# Patient Record
Sex: FEMALE | Race: BLACK OR AFRICAN AMERICAN | NOT HISPANIC OR LATINO | URBAN - METROPOLITAN AREA
[De-identification: names, ages, dates, MRNs, and addresses within clinical notes are randomized per-mention and may not be internally consistent; named-entity substitution may affect disease eponyms.]

---

## 2023-07-28 ENCOUNTER — HOSPITAL ENCOUNTER (OUTPATIENT)
Facility: OTHER | Age: 88
Discharge: HOME | End: 2023-07-31
Attending: EMERGENCY MEDICINE | Admitting: HOSPITALIST
Payer: MEDICARE

## 2023-07-28 DIAGNOSIS — I50.32 CHRONIC DIASTOLIC HEART FAILURE: ICD-10-CM
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
## 2023-07-28 DIAGNOSIS — R79.89 ELEVATED TROPONIN: Primary | ICD-10-CM

## 2023-07-28 DIAGNOSIS — R42 LIGHTHEADEDNESS: ICD-10-CM

## 2023-07-28 DIAGNOSIS — M54.50 ACUTE MIDLINE LOW BACK PAIN WITHOUT SCIATICA: ICD-10-CM

## 2023-07-28 DIAGNOSIS — R42 DIZZINESS: ICD-10-CM

## 2023-07-28 DIAGNOSIS — R54 AGE-RELATED PHYSICAL DEBILITY: ICD-10-CM

## 2023-07-28 DIAGNOSIS — J10.1 INFLUENZA B: ICD-10-CM

## 2023-07-28 LAB
ALBUMIN SERPL BCP-MCNC: 4 G/DL (ref 3.5–5.2)
ALP SERPL-CCNC: 84 U/L (ref 55–135)
ALT SERPL W/O P-5'-P-CCNC: 14 U/L (ref 10–44)
ANION GAP SERPL CALC-SCNC: 14 MMOL/L (ref 8–16)
AST SERPL-CCNC: 22 U/L (ref 10–40)
BASOPHILS # BLD AUTO: 0.02 K/UL (ref 0–0.2)
BASOPHILS NFR BLD: 0.5 % (ref 0–1.9)
BILIRUB SERPL-MCNC: 0.4 MG/DL (ref 0.1–1)
BUN SERPL-MCNC: 21 MG/DL (ref 10–30)
CALCIUM SERPL-MCNC: 9.8 MG/DL (ref 8.7–10.5)
CHLORIDE SERPL-SCNC: 105 MMOL/L (ref 95–110)
CO2 SERPL-SCNC: 21 MMOL/L (ref 23–29)
CREAT SERPL-MCNC: 1.3 MG/DL (ref 0.5–1.4)
CTP QC/QA: YES
DIFFERENTIAL METHOD: ABNORMAL
EOSINOPHIL # BLD AUTO: 0.1 K/UL (ref 0–0.5)
EOSINOPHIL NFR BLD: 1.3 % (ref 0–8)
ERYTHROCYTE [DISTWIDTH] IN BLOOD BY AUTOMATED COUNT: 15.5 % (ref 11.5–14.5)
EST. GFR  (NO RACE VARIABLE): 37 ML/MIN/1.73 M^2
GLUCOSE SERPL-MCNC: 87 MG/DL (ref 70–110)
HCT VFR BLD AUTO: 37.7 % (ref 37–48.5)
HGB BLD-MCNC: 11.9 G/DL (ref 12–16)
IMM GRANULOCYTES # BLD AUTO: 0.03 K/UL (ref 0–0.04)
IMM GRANULOCYTES NFR BLD AUTO: 0.8 % (ref 0–0.5)
LYMPHOCYTES # BLD AUTO: 1 K/UL (ref 1–4.8)
LYMPHOCYTES NFR BLD: 25.7 % (ref 18–48)
MCH RBC QN AUTO: 27.6 PG (ref 27–31)
MCHC RBC AUTO-ENTMCNC: 31.6 G/DL (ref 32–36)
MCV RBC AUTO: 88 FL (ref 82–98)
MONOCYTES # BLD AUTO: 0.6 K/UL (ref 0.3–1)
MONOCYTES NFR BLD: 15 % (ref 4–15)
NEUTROPHILS # BLD AUTO: 2.1 K/UL (ref 1.8–7.7)
NEUTROPHILS NFR BLD: 56.7 % (ref 38–73)
NRBC BLD-RTO: 0 /100 WBC
PLATELET # BLD AUTO: 199 K/UL (ref 150–450)
PMV BLD AUTO: 9.9 FL (ref 9.2–12.9)
POC MOLECULAR INFLUENZA A AGN: NEGATIVE
POC MOLECULAR INFLUENZA B AGN: POSITIVE
POTASSIUM SERPL-SCNC: 4.6 MMOL/L (ref 3.5–5.1)
PROT SERPL-MCNC: 7.8 G/DL (ref 6–8.4)
RBC # BLD AUTO: 4.31 M/UL (ref 4–5.4)
SODIUM SERPL-SCNC: 140 MMOL/L (ref 136–145)
TROPONIN I SERPL DL<=0.01 NG/ML-MCNC: 0.04 NG/ML (ref 0–0.03)
WBC # BLD AUTO: 3.74 K/UL (ref 3.9–12.7)

## 2023-07-28 PROCEDURE — 96361 HYDRATE IV INFUSION ADD-ON: CPT

## 2023-07-28 PROCEDURE — 80053 COMPREHEN METABOLIC PANEL: CPT | Performed by: EMERGENCY MEDICINE

## 2023-07-28 PROCEDURE — 96374 THER/PROPH/DIAG INJ IV PUSH: CPT

## 2023-07-28 PROCEDURE — 85025 COMPLETE CBC W/AUTO DIFF WBC: CPT | Performed by: EMERGENCY MEDICINE

## 2023-07-28 PROCEDURE — 99285 EMERGENCY DEPT VISIT HI MDM: CPT | Mod: 25

## 2023-07-28 PROCEDURE — 93010 ELECTROCARDIOGRAM REPORT: CPT | Mod: ,,, | Performed by: INTERNAL MEDICINE

## 2023-07-28 PROCEDURE — 84484 ASSAY OF TROPONIN QUANT: CPT | Performed by: EMERGENCY MEDICINE

## 2023-07-28 PROCEDURE — 93010 EKG 12-LEAD: ICD-10-PCS | Mod: ,,, | Performed by: INTERNAL MEDICINE

## 2023-07-28 PROCEDURE — 93005 ELECTROCARDIOGRAM TRACING: CPT

## 2023-07-28 PROCEDURE — 87635 SARS-COV-2 COVID-19 AMP PRB: CPT | Performed by: EMERGENCY MEDICINE

## 2023-07-28 RX ORDER — ATORVASTATIN CALCIUM 10 MG/1
10 TABLET, FILM COATED ORAL DAILY
COMMUNITY

## 2023-07-28 RX ORDER — NAPROXEN 500 MG/1
500 TABLET ORAL
Status: COMPLETED | OUTPATIENT
Start: 2023-07-28 | End: 2023-07-29

## 2023-07-28 RX ORDER — FERROUS SULFATE 325(65) MG
325 TABLET ORAL 2 TIMES DAILY
COMMUNITY

## 2023-07-28 RX ORDER — CHOLECALCIFEROL (VITAMIN D3) 25 MCG
2000 TABLET ORAL DAILY
COMMUNITY

## 2023-07-28 RX ORDER — SODIUM CHLORIDE 9 MG/ML
1000 INJECTION, SOLUTION INTRAVENOUS
Status: COMPLETED | OUTPATIENT
Start: 2023-07-28 | End: 2023-07-29

## 2023-07-28 RX ORDER — LISINOPRIL AND HYDROCHLOROTHIAZIDE 12.5; 2 MG/1; MG/1
1 TABLET ORAL DAILY
Status: ON HOLD | COMMUNITY
End: 2023-07-29

## 2023-07-28 RX ORDER — LISINOPRIL 20 MG/1
30 TABLET ORAL DAILY
COMMUNITY

## 2023-07-28 RX ORDER — FUROSEMIDE 20 MG/1
20 TABLET ORAL
COMMUNITY

## 2023-07-28 NOTE — Clinical Note
Diagnosis: Elevated troponin [081552]   Future Attending Provider: AUSTIN CORTES [[id number]]   Admitting Provider:: AUSTIN CORTES [[id number]]

## 2023-07-29 PROBLEM — R79.89 ELEVATED TROPONIN: Status: ACTIVE | Noted: 2023-07-29

## 2023-07-29 PROBLEM — E03.9 ACQUIRED HYPOTHYROIDISM: Status: ACTIVE | Noted: 2023-07-29

## 2023-07-29 PROBLEM — I16.1 HYPERTENSIVE EMERGENCY: Status: ACTIVE | Noted: 2023-07-29

## 2023-07-29 PROBLEM — I16.0 HYPERTENSIVE URGENCY: Status: ACTIVE | Noted: 2023-07-29

## 2023-07-29 PROBLEM — J10.1 INFLUENZA B: Status: ACTIVE | Noted: 2023-07-29

## 2023-07-29 LAB
ALBUMIN SERPL BCP-MCNC: 3.6 G/DL (ref 3.5–5.2)
ALP SERPL-CCNC: 82 U/L (ref 55–135)
ALT SERPL W/O P-5'-P-CCNC: 12 U/L (ref 10–44)
ANION GAP SERPL CALC-SCNC: 11 MMOL/L (ref 8–16)
AST SERPL-CCNC: 20 U/L (ref 10–40)
BASOPHILS # BLD AUTO: 0.03 K/UL (ref 0–0.2)
BASOPHILS NFR BLD: 0.6 % (ref 0–1.9)
BILIRUB SERPL-MCNC: 0.6 MG/DL (ref 0.1–1)
BILIRUB UR QL STRIP: NEGATIVE
BUN SERPL-MCNC: 21 MG/DL (ref 10–30)
CALCIUM SERPL-MCNC: 9.5 MG/DL (ref 8.7–10.5)
CHLORIDE SERPL-SCNC: 104 MMOL/L (ref 95–110)
CLARITY UR: CLEAR
CO2 SERPL-SCNC: 25 MMOL/L (ref 23–29)
COLOR UR: YELLOW
CREAT SERPL-MCNC: 1.3 MG/DL (ref 0.5–1.4)
CTP QC/QA: YES
DIFFERENTIAL METHOD: ABNORMAL
EOSINOPHIL # BLD AUTO: 0 K/UL (ref 0–0.5)
EOSINOPHIL NFR BLD: 0.8 % (ref 0–8)
ERYTHROCYTE [DISTWIDTH] IN BLOOD BY AUTOMATED COUNT: 15.6 % (ref 11.5–14.5)
EST. GFR  (NO RACE VARIABLE): 37 ML/MIN/1.73 M^2
GLUCOSE SERPL-MCNC: 123 MG/DL (ref 70–110)
GLUCOSE UR QL STRIP: NEGATIVE
HCT VFR BLD AUTO: 37.2 % (ref 37–48.5)
HGB BLD-MCNC: 11.5 G/DL (ref 12–16)
HGB UR QL STRIP: ABNORMAL
IMM GRANULOCYTES # BLD AUTO: 0.03 K/UL (ref 0–0.04)
IMM GRANULOCYTES NFR BLD AUTO: 0.6 % (ref 0–0.5)
KETONES UR QL STRIP: NEGATIVE
LEUKOCYTE ESTERASE UR QL STRIP: NEGATIVE
LYMPHOCYTES # BLD AUTO: 0.6 K/UL (ref 1–4.8)
LYMPHOCYTES NFR BLD: 13.1 % (ref 18–48)
MAGNESIUM SERPL-MCNC: 1.8 MG/DL (ref 1.6–2.6)
MCH RBC QN AUTO: 27.3 PG (ref 27–31)
MCHC RBC AUTO-ENTMCNC: 30.9 G/DL (ref 32–36)
MCV RBC AUTO: 88 FL (ref 82–98)
MONOCYTES # BLD AUTO: 0.5 K/UL (ref 0.3–1)
MONOCYTES NFR BLD: 10.2 % (ref 4–15)
NEUTROPHILS # BLD AUTO: 3.7 K/UL (ref 1.8–7.7)
NEUTROPHILS NFR BLD: 74.7 % (ref 38–73)
NITRITE UR QL STRIP: NEGATIVE
NRBC BLD-RTO: 0 /100 WBC
PH UR STRIP: 6 [PH] (ref 5–8)
PHOSPHATE SERPL-MCNC: 3.7 MG/DL (ref 2.7–4.5)
PLATELET # BLD AUTO: 191 K/UL (ref 150–450)
PMV BLD AUTO: 9.9 FL (ref 9.2–12.9)
POTASSIUM SERPL-SCNC: 3.4 MMOL/L (ref 3.5–5.1)
PROT SERPL-MCNC: 7 G/DL (ref 6–8.4)
PROT UR QL STRIP: ABNORMAL
RBC # BLD AUTO: 4.22 M/UL (ref 4–5.4)
SARS-COV-2 RDRP RESP QL NAA+PROBE: NEGATIVE
SODIUM SERPL-SCNC: 140 MMOL/L (ref 136–145)
SP GR UR STRIP: 1.02 (ref 1–1.03)
TROPONIN I SERPL DL<=0.01 NG/ML-MCNC: 0.04 NG/ML (ref 0–0.03)
TROPONIN I SERPL DL<=0.01 NG/ML-MCNC: 0.07 NG/ML (ref 0–0.03)
URN SPEC COLLECT METH UR: ABNORMAL
UROBILINOGEN UR STRIP-ACNC: NEGATIVE EU/DL
WBC # BLD AUTO: 4.89 K/UL (ref 3.9–12.7)

## 2023-07-29 PROCEDURE — 94761 N-INVAS EAR/PLS OXIMETRY MLT: CPT

## 2023-07-29 PROCEDURE — 25000003 PHARM REV CODE 250: Performed by: EMERGENCY MEDICINE

## 2023-07-29 PROCEDURE — 84484 ASSAY OF TROPONIN QUANT: CPT | Mod: 91 | Performed by: NURSE PRACTITIONER

## 2023-07-29 PROCEDURE — 96372 THER/PROPH/DIAG INJ SC/IM: CPT | Performed by: NURSE PRACTITIONER

## 2023-07-29 PROCEDURE — 80053 COMPREHEN METABOLIC PANEL: CPT | Performed by: NURSE PRACTITIONER

## 2023-07-29 PROCEDURE — 83735 ASSAY OF MAGNESIUM: CPT | Performed by: NURSE PRACTITIONER

## 2023-07-29 PROCEDURE — 63600175 PHARM REV CODE 636 W HCPCS: Performed by: EMERGENCY MEDICINE

## 2023-07-29 PROCEDURE — 97530 THERAPEUTIC ACTIVITIES: CPT

## 2023-07-29 PROCEDURE — 36415 COLL VENOUS BLD VENIPUNCTURE: CPT | Performed by: NURSE PRACTITIONER

## 2023-07-29 PROCEDURE — G0378 HOSPITAL OBSERVATION PER HR: HCPCS

## 2023-07-29 PROCEDURE — 96376 TX/PRO/DX INJ SAME DRUG ADON: CPT

## 2023-07-29 PROCEDURE — 99233 SBSQ HOSP IP/OBS HIGH 50: CPT | Mod: ,,, | Performed by: NURSE PRACTITIONER

## 2023-07-29 PROCEDURE — 97535 SELF CARE MNGMENT TRAINING: CPT

## 2023-07-29 PROCEDURE — 25000003 PHARM REV CODE 250: Performed by: NURSE PRACTITIONER

## 2023-07-29 PROCEDURE — 63600175 PHARM REV CODE 636 W HCPCS: Performed by: NURSE PRACTITIONER

## 2023-07-29 PROCEDURE — 85025 COMPLETE CBC W/AUTO DIFF WBC: CPT | Performed by: NURSE PRACTITIONER

## 2023-07-29 PROCEDURE — 99223 PR INITIAL HOSPITAL CARE,LEVL III: ICD-10-PCS | Mod: ,,, | Performed by: NURSE PRACTITIONER

## 2023-07-29 PROCEDURE — 25000003 PHARM REV CODE 250: Performed by: HOSPITALIST

## 2023-07-29 PROCEDURE — 97162 PT EVAL MOD COMPLEX 30 MIN: CPT

## 2023-07-29 PROCEDURE — 97166 OT EVAL MOD COMPLEX 45 MIN: CPT

## 2023-07-29 PROCEDURE — 97116 GAIT TRAINING THERAPY: CPT

## 2023-07-29 PROCEDURE — 99233 PR SUBSEQUENT HOSPITAL CARE,LEVL III: ICD-10-PCS | Mod: ,,, | Performed by: NURSE PRACTITIONER

## 2023-07-29 PROCEDURE — 96375 TX/PRO/DX INJ NEW DRUG ADDON: CPT

## 2023-07-29 PROCEDURE — 81003 URINALYSIS AUTO W/O SCOPE: CPT | Performed by: EMERGENCY MEDICINE

## 2023-07-29 PROCEDURE — 84100 ASSAY OF PHOSPHORUS: CPT | Performed by: NURSE PRACTITIONER

## 2023-07-29 PROCEDURE — 99223 1ST HOSP IP/OBS HIGH 75: CPT | Mod: ,,, | Performed by: NURSE PRACTITIONER

## 2023-07-29 RX ORDER — OSELTAMIVIR PHOSPHATE 75 MG/1
75 CAPSULE ORAL
Status: COMPLETED | OUTPATIENT
Start: 2023-07-29 | End: 2023-07-29

## 2023-07-29 RX ORDER — LISINOPRIL 10 MG/1
20 TABLET ORAL
Status: COMPLETED | OUTPATIENT
Start: 2023-07-29 | End: 2023-07-29

## 2023-07-29 RX ORDER — METOPROLOL SUCCINATE 25 MG/1
25 TABLET, EXTENDED RELEASE ORAL DAILY
COMMUNITY

## 2023-07-29 RX ORDER — CHOLECALCIFEROL (VITAMIN D3) 25 MCG
2000 TABLET ORAL DAILY
Status: DISCONTINUED | OUTPATIENT
Start: 2023-07-29 | End: 2023-07-31 | Stop reason: HOSPADM

## 2023-07-29 RX ORDER — LEVOTHYROXINE SODIUM 25 UG/1
25 TABLET ORAL
Status: DISCONTINUED | OUTPATIENT
Start: 2023-07-29 | End: 2023-07-31 | Stop reason: HOSPADM

## 2023-07-29 RX ORDER — TALC
6 POWDER (GRAM) TOPICAL NIGHTLY PRN
Status: DISCONTINUED | OUTPATIENT
Start: 2023-07-29 | End: 2023-07-29

## 2023-07-29 RX ORDER — SODIUM CHLORIDE 0.9 % (FLUSH) 0.9 %
10 SYRINGE (ML) INJECTION EVERY 8 HOURS PRN
Status: DISCONTINUED | OUTPATIENT
Start: 2023-07-29 | End: 2023-07-31 | Stop reason: HOSPADM

## 2023-07-29 RX ORDER — OSELTAMIVIR PHOSPHATE 75 MG/1
75 CAPSULE ORAL DAILY
Status: DISCONTINUED | OUTPATIENT
Start: 2023-07-29 | End: 2023-07-29 | Stop reason: SDUPTHER

## 2023-07-29 RX ORDER — OSELTAMIVIR PHOSPHATE 6 MG/ML
30 FOR SUSPENSION ORAL DAILY
Status: DISCONTINUED | OUTPATIENT
Start: 2023-07-29 | End: 2023-07-31 | Stop reason: HOSPADM

## 2023-07-29 RX ORDER — HYDRALAZINE HYDROCHLORIDE 20 MG/ML
5 INJECTION INTRAMUSCULAR; INTRAVENOUS EVERY 8 HOURS PRN
Status: DISCONTINUED | OUTPATIENT
Start: 2023-07-29 | End: 2023-07-31 | Stop reason: HOSPADM

## 2023-07-29 RX ORDER — LEVOTHYROXINE SODIUM 25 UG/1
25 TABLET ORAL
COMMUNITY

## 2023-07-29 RX ORDER — ACETAMINOPHEN 325 MG/1
650 TABLET ORAL EVERY 4 HOURS PRN
Status: DISCONTINUED | OUTPATIENT
Start: 2023-07-29 | End: 2023-07-31 | Stop reason: HOSPADM

## 2023-07-29 RX ORDER — FUROSEMIDE 20 MG/1
20 TABLET ORAL
Status: DISCONTINUED | OUTPATIENT
Start: 2023-07-29 | End: 2023-07-30

## 2023-07-29 RX ORDER — HEPARIN SODIUM 5000 [USP'U]/ML
5000 INJECTION, SOLUTION INTRAVENOUS; SUBCUTANEOUS EVERY 8 HOURS
Status: DISCONTINUED | OUTPATIENT
Start: 2023-07-29 | End: 2023-07-31 | Stop reason: HOSPADM

## 2023-07-29 RX ORDER — POTASSIUM CHLORIDE 20 MEQ/1
40 TABLET, EXTENDED RELEASE ORAL ONCE
Status: COMPLETED | OUTPATIENT
Start: 2023-07-29 | End: 2023-07-29

## 2023-07-29 RX ORDER — FUROSEMIDE 20 MG/1
20 TABLET ORAL
Status: COMPLETED | OUTPATIENT
Start: 2023-07-29 | End: 2023-07-29

## 2023-07-29 RX ORDER — ONDANSETRON 2 MG/ML
4 INJECTION INTRAMUSCULAR; INTRAVENOUS EVERY 8 HOURS PRN
Status: DISCONTINUED | OUTPATIENT
Start: 2023-07-29 | End: 2023-07-31 | Stop reason: HOSPADM

## 2023-07-29 RX ORDER — METOPROLOL TARTRATE 25 MG/1
25 TABLET, FILM COATED ORAL
Status: COMPLETED | OUTPATIENT
Start: 2023-07-29 | End: 2023-07-29

## 2023-07-29 RX ORDER — NALOXONE HCL 0.4 MG/ML
0.02 VIAL (ML) INJECTION
Status: DISCONTINUED | OUTPATIENT
Start: 2023-07-29 | End: 2023-07-31 | Stop reason: HOSPADM

## 2023-07-29 RX ORDER — METOPROLOL SUCCINATE 25 MG/1
25 TABLET, EXTENDED RELEASE ORAL DAILY
Status: DISCONTINUED | OUTPATIENT
Start: 2023-07-29 | End: 2023-07-31 | Stop reason: HOSPADM

## 2023-07-29 RX ORDER — HYDROCHLOROTHIAZIDE 12.5 MG/1
12.5 TABLET ORAL
Status: COMPLETED | OUTPATIENT
Start: 2023-07-29 | End: 2023-07-29

## 2023-07-29 RX ORDER — ONDANSETRON 2 MG/ML
4 INJECTION INTRAMUSCULAR; INTRAVENOUS EVERY 8 HOURS PRN
Status: DISCONTINUED | OUTPATIENT
Start: 2023-07-29 | End: 2023-07-29

## 2023-07-29 RX ORDER — LABETALOL HYDROCHLORIDE 5 MG/ML
10 INJECTION, SOLUTION INTRAVENOUS
Status: COMPLETED | OUTPATIENT
Start: 2023-07-29 | End: 2023-07-29

## 2023-07-29 RX ORDER — HYDRALAZINE HYDROCHLORIDE 20 MG/ML
10 INJECTION INTRAMUSCULAR; INTRAVENOUS EVERY 8 HOURS PRN
Status: DISCONTINUED | OUTPATIENT
Start: 2023-07-29 | End: 2023-07-29

## 2023-07-29 RX ORDER — SODIUM CHLORIDE 0.9 % (FLUSH) 0.9 %
10 SYRINGE (ML) INJECTION
Status: DISCONTINUED | OUTPATIENT
Start: 2023-07-29 | End: 2023-07-29

## 2023-07-29 RX ORDER — LANOLIN ALCOHOL/MO/W.PET/CERES
1 CREAM (GRAM) TOPICAL 2 TIMES DAILY
Status: DISCONTINUED | OUTPATIENT
Start: 2023-07-29 | End: 2023-07-31 | Stop reason: HOSPADM

## 2023-07-29 RX ORDER — ATORVASTATIN CALCIUM 10 MG/1
10 TABLET, FILM COATED ORAL DAILY
Status: DISCONTINUED | OUTPATIENT
Start: 2023-07-29 | End: 2023-07-31 | Stop reason: HOSPADM

## 2023-07-29 RX ADMIN — LEVOTHYROXINE SODIUM 25 MCG: 25 TABLET ORAL at 05:07

## 2023-07-29 RX ADMIN — NAPROXEN 500 MG: 500 TABLET ORAL at 12:07

## 2023-07-29 RX ADMIN — LISINOPRIL 30 MG: 20 TABLET ORAL at 08:07

## 2023-07-29 RX ADMIN — METOPROLOL TARTRATE 25 MG: 25 TABLET, FILM COATED ORAL at 12:07

## 2023-07-29 RX ADMIN — FUROSEMIDE 20 MG: 20 TABLET ORAL at 01:07

## 2023-07-29 RX ADMIN — OSELTAMIVIR PHOSPHATE 30 MG: 6 POWDER, FOR SUSPENSION ORAL at 09:07

## 2023-07-29 RX ADMIN — POTASSIUM CHLORIDE 40 MEQ: 1500 TABLET, EXTENDED RELEASE ORAL at 03:07

## 2023-07-29 RX ADMIN — Medication 2000 UNITS: at 08:07

## 2023-07-29 RX ADMIN — ACETAMINOPHEN 650 MG: 325 TABLET, FILM COATED ORAL at 03:07

## 2023-07-29 RX ADMIN — FUROSEMIDE 20 MG: 20 TABLET ORAL at 12:07

## 2023-07-29 RX ADMIN — ATORVASTATIN CALCIUM 10 MG: 10 TABLET, FILM COATED ORAL at 08:07

## 2023-07-29 RX ADMIN — SODIUM CHLORIDE 1000 ML: 9 INJECTION, SOLUTION INTRAVENOUS at 12:07

## 2023-07-29 RX ADMIN — METOPROLOL SUCCINATE 25 MG: 25 TABLET, EXTENDED RELEASE ORAL at 08:07

## 2023-07-29 RX ADMIN — FERROUS SULFATE TAB 325 MG (65 MG ELEMENTAL FE) 1 EACH: 325 (65 FE) TAB at 08:07

## 2023-07-29 RX ADMIN — LISINOPRIL 20 MG: 10 TABLET ORAL at 12:07

## 2023-07-29 RX ADMIN — OSELTAMAVIR PHOSPHATE 75 MG: 75 CAPSULE ORAL at 12:07

## 2023-07-29 RX ADMIN — HYDROCHLOROTHIAZIDE 12.5 MG: 12.5 TABLET ORAL at 12:07

## 2023-07-29 RX ADMIN — HEPARIN SODIUM 5000 UNITS: 5000 INJECTION INTRAVENOUS; SUBCUTANEOUS at 02:07

## 2023-07-29 RX ADMIN — HYDRALAZINE HYDROCHLORIDE 5 MG: 20 INJECTION INTRAMUSCULAR; INTRAVENOUS at 05:07

## 2023-07-29 RX ADMIN — HYDRALAZINE HYDROCHLORIDE 5 MG: 20 INJECTION INTRAMUSCULAR; INTRAVENOUS at 04:07

## 2023-07-29 RX ADMIN — HEPARIN SODIUM 5000 UNITS: 5000 INJECTION INTRAVENOUS; SUBCUTANEOUS at 05:07

## 2023-07-29 RX ADMIN — HEPARIN SODIUM 5000 UNITS: 5000 INJECTION INTRAVENOUS; SUBCUTANEOUS at 09:07

## 2023-07-29 RX ADMIN — LABETALOL HYDROCHLORIDE 10 MG: 5 INJECTION INTRAVENOUS at 01:07

## 2023-07-29 RX ADMIN — FERROUS SULFATE TAB 325 MG (65 MG ELEMENTAL FE) 1 EACH: 325 (65 FE) TAB at 09:07

## 2023-07-29 NOTE — ED PROVIDER NOTES
"  Source of History:  Medical record, patient, EMS.     Chief complaint:  Per triage note: "Dizziness (Pt had a ground level trip and fall, pt denies loss of consciousness - pt is complaining of dizziness post fall, per ems pt claims to have been dizzy x2 days prior - lower back pain )  "    HPI:    Patient presents for evaluation after mechanical fall just prior to arrival.  Pt also reports dizziness for last 2 days.She notes she was getting out the car when she had a ground level fall. She denies hitting her head or loss of consciousness. Patient reports having associated back pain from fall. She denies any recent leg pain or swelling. She notes that she have not been eating due to her trip.     ROS:   See HPI for pertinent Review of Systems      Review of patient's allergies indicates:  No Known Allergies    PMH:  As per HPI and below:  History reviewed. No pertinent past medical history.    No past surgical history on file.         Physical Exam:      Nursing note and vitals reviewed.  BP (!) 221/103   Pulse 72   Temp 97.9 °F (36.6 °C)   Resp 16   Ht 5' 2" (1.575 m) Comment: Simultaneous filing. User may not have seen previous data.  Wt 47.6 kg (105 lb) Comment: Simultaneous filing. User may not have seen previous data.  SpO2 100%   Breastfeeding No   BMI 19.20 kg/m²     Constitutional:  Awake, alert. No distress.  Appears much younger than stated age.  Eyes: EOMI. No discharge. Anicteric.  HENT:  No lacerations, contusions, or abrasions on close inspection.  Neck: Normal range of motion. Neck supple.  No midline spinal tenderness, step-offs, or deformities.  Cardiovascular: Normal rate. No murmur, no gallop and no friction rub heard.   Pulmonary/Chest: No respiratory distress. Effort normal. No wheezes, no rales, no rhonchi.   Abdominal: Bowel sounds normal. Soft. No distension and no mass. There is no tenderness. There is no rebound, no guarding, no tenderness at McBurney's point.  Musculoskeletal: " Normal range of motion.  No bony tenderness at large joints or long bones.  Midline lumbar spinal tenderness. No step-offs, or deformities.  Neurological:  GCS 15. Awake, alert, oriented. No gross cranial nerve, light touch or strength deficit. Coordination normal.   Skin: Skin is warm and dry.   EXT: 2+ radial pulses.   Psychiatric: Behavior is normal. Judgment normal.        Medical Decision Making / Independent Interpretations / External Records Reviewed:        I decided to obtain the patient's medical records. I reviewed patient's prior external notes / results: inpatient admission documentation (9/2021). This reveals PMH of past medical history significant for SVT, nonsustained VT, essential hypertension, CAD, HFpEF    ED Course as of 07/29/23 0351 Fri Jul 28, 2023 2326 Patient is a 89 year old female with HTN,  and  from [place] in town for a conference who presents for evaluation after mechanical fall.  Patient was attempting to use a friend's rolling walker to step out of a vehicle when it slipped out from under her, causing her to hit a wall with her back, then fall onto her buttocks.  She reports lumbar paraspinal pain.  No focal deficits.  On my interview, patient stated that she felt at her baseline prior to the episode, but she reported to EMS that she has been feeling dizzy for about the past 2 days.  She also admitted to not taking her antihypertensives for last 2 days.  On my interview, she states that because of travel (came from [place] on a bus), she has not eaten as much as usual.  She denies any associated lower extremity edema or pain.  On my exam, patient has are tenderness, neurovascularly intact, no abdominal tenderness.   The initial differential included spinal fracture, hypertensive emergency, dehydration, gross metabolic abnormality, occult infection. [RC]   2333 --  EKG Interpretation: I independently reviewed and interpreted EKG which shows normal  sinus rhythm at 70 beats per minute, no STEMI, no significant acute ST/T abnormalities, normal intervals.  No acute change compared to prior tracing.  --   [RC]   Sat Jul 29, 2023   0023 I independently interpreted and reviewed the patient's labs notable for elevated troponin, positive influenza B.   I independently reviewed and interpreted CXR which shows no pneumothorax, no focal consolidation, cardiomegaly, no acute process.  I independently interpreted and reviewed the patient's films, notable for no acute fracture, dislocation, or radiopaque foreign body.      I anticipate admission pending no acute significant findings on CTs.     Two of the patient's co-congregants who know her very well are now at the bedside.  [RC]   0025 Patient has a true medical need for admission/observation. I have discussed the patient history, exam, findings with hospitalist NAGI Taveras, who accepts the patient pending no acute significant findings on CT imaging.    [RC]   0048 I independently reviewed and interpreted CT head, lumbar spine, and cervical spine which show no acute intracranial bleeding, mass, skull fracture, acute intracranial process, vertebral fracture, or cord injury.     [RC]      ED Course User Index  [RC] Polo Vogel MD         =====================================  Critical Care:  35 minutes total critical care time was personally spent by me, exclusive of procedures and separately billable time.   Critical care was necessary to treat or prevent imminent or life-threatening deterioration of the following conditions:  Respiratory failure, acute coronary syndrome   =====================================      Medications   oseltamivir capsule 75 mg (has no administration in time range)   atorvastatin tablet 10 mg (has no administration in time range)   ferrous sulfate tablet 1 each (has no administration in time range)   levothyroxine tablet 25 mcg (has no administration in time range)   furosemide tablet 20 mg  (has no administration in time range)   lisinopriL tablet 30 mg (has no administration in time range)   metoprolol succinate (TOPROL-XL) 24 hr tablet 25 mg (has no administration in time range)   vitamin D 1000 units tablet 2,000 Units (has no administration in time range)   labetaloL injection 10 mg (has no administration in time range)   0.9%  NaCl infusion (1,000 mLs Intravenous New Bag 7/29/23 0029)   naproxen tablet 500 mg (500 mg Oral Given 7/29/23 0031)   furosemide tablet 20 mg (20 mg Oral Given 7/29/23 0027)   lisinopriL tablet 20 mg (20 mg Oral Given 7/29/23 0027)   hydroCHLOROthiazide tablet 12.5 mg (12.5 mg Oral Given 7/29/23 0027)   metoprolol tartrate (LOPRESSOR) tablet 25 mg (25 mg Oral Given 7/29/23 0027)   oseltamivir capsule 75 mg (75 mg Oral Given 7/29/23 0031)            ---  I, Destini Mirza, scribed for, and in the presence of, Dr. Vogel. I performed the scribed service and the documentation accurately describes the services I performed. I attest to the accuracy of the note.     Physician Attestation for Scribe:   I, Polo Vogel MD, reviewed documentation as scribed in my presence, which is both accurate and complete.    Diagnostic Impression:    1. Elevated troponin    2. Lightheadedness    3. Dizziness    4. Accident due to mechanical fall without injury, initial encounter    5. Acute midline low back pain without sciatica    6. Influenza B         ED Disposition Condition    Observation Stable          No future appointments.          Polo Vogel MD  07/29/23 0047       Polo Vogel MD  07/29/23 0351

## 2023-07-29 NOTE — SUBJECTIVE & OBJECTIVE
Interval History: patient needs frequent redirecting. She forgets the sequence of events that brought to the hospital. She does report that she fell when she was trying to use her friend's rollator. She denies new pain. Updated Stephan on plan and called sister Babita but did not get an answer.     Review of Systems   Constitutional:  Negative for activity change.   HENT:  Positive for ear pain (chronic external pain behind left ear).    Eyes:  Negative for visual disturbance.   Cardiovascular:  Positive for leg swelling (chronic).   Gastrointestinal: Negative.  Negative for abdominal distention and constipation.   Endocrine: Negative for polyuria.   Genitourinary:  Negative for difficulty urinating.   Musculoskeletal:  Negative for arthralgias.   Neurological:  Negative for dizziness.   Psychiatric/Behavioral:  Positive for confusion.      Objective:     Vital Signs (Most Recent):  Temp: 97.8 °F (36.6 °C) (07/29/23 1200)  Pulse: (!) 56 (07/29/23 1200)  Resp: 17 (07/29/23 1200)  BP: (!) 165/67 (07/29/23 1200)  SpO2: 100 % (07/29/23 1200) Vital Signs (24h Range):  Temp:  [97.6 °F (36.4 °C)-98 °F (36.7 °C)] 97.8 °F (36.6 °C)  Pulse:  [56-72] 56  Resp:  [12-18] 17  SpO2:  [98 %-100 %] 100 %  BP: (134-221)/() 165/67     Weight: 52.4 kg (115 lb 9.6 oz)  Body mass index is 20.48 kg/m².    Intake/Output Summary (Last 24 hours) at 7/29/2023 1421  Last data filed at 7/29/2023 1136  Gross per 24 hour   Intake 1340 ml   Output 100 ml   Net 1240 ml         Physical Exam  Vitals reviewed.   Eyes:      Pupils: Pupils are equal, round, and reactive to light.   Cardiovascular:      Rate and Rhythm: Normal rate.   Pulmonary:      Effort: Pulmonary effort is normal.   Abdominal:      General: Bowel sounds are normal.      Palpations: Abdomen is soft.   Skin:     General: Skin is warm.   Neurological:      General: No focal deficit present.      Mental Status: She is alert. She is disoriented.   Psychiatric:         Mood and  Affect: Affect normal.         Cognition and Memory: Memory is impaired.             Significant Labs: All pertinent labs within the past 24 hours have been reviewed.  BMP:   Recent Labs   Lab 07/29/23  0551   *      K 3.4*      CO2 25   BUN 21   CREATININE 1.3   CALCIUM 9.5   MG 1.8     CBC:   Recent Labs   Lab 07/28/23  2324 07/29/23  0551   WBC 3.74* 4.89   HGB 11.9* 11.5*   HCT 37.7 37.2    191     CMP:   Recent Labs   Lab 07/28/23  2324 07/29/23  0551    140   K 4.6 3.4*    104   CO2 21* 25   GLU 87 123*   BUN 21 21   CREATININE 1.3 1.3   CALCIUM 9.8 9.5   PROT 7.8 7.0   ALBUMIN 4.0 3.6   BILITOT 0.4 0.6   ALKPHOS 84 82   AST 22 20   ALT 14 12   ANIONGAP 14 11       Significant Imaging: I have reviewed all pertinent imaging results/findings within the past 24 hours.  CT Cervical Spine Without Contrast  Narrative: EXAMINATION:  CT CERVICAL SPINE WITHOUT CONTRAST    CLINICAL HISTORY:  Neck pain, recent trauma;    TECHNIQUE:  Low dose axial images, sagittal and coronal reformations were performed though the cervical spine.  Contrast was not administered.    COMPARISON:  None.    FINDINGS:    Minimal anterolisthesis of C3 with respect to C4.  Minimal retrolisthesis of C6 with respect to C7. Otherwise, grossly normal sagittal alignment.  Moderate to advanced degenerative changes throughout the cervical spine.  No severe central canal stenosis.  Variable multilevel neural foraminal narrowing.  Multilevel facet fusion in the lower cervical and upper thoracic spine.  Vertebral body heights are relatively well maintained.  No acute displaced fracture identified.  Prevertebral soft tissues are normal.  Lung apices are clear.  Atherosclerosis of the bilateral carotid arteries.  Impression: No acute cervical fracture.    Multilevel degenerative changes in the cervical spine.    Electronically signed by: Tra Schaffer MD  Date:    07/29/2023  Time:    00:54  CT Head Without  Contrast  Narrative: EXAMINATION:  CT HEAD WITHOUT CONTRAST    CLINICAL HISTORY:  Head trauma, minor (Age >= 65y);    TECHNIQUE:  Low dose axial images were obtained through the head.  Coronal and sagittal reformations were also performed. Contrast was not administered.    COMPARISON:  None.    FINDINGS:  Generalized cerebral volume loss with ex vacuo dilation of the ventricles and sulci.  Patchy periventricular white matter hypoattenuation suggestive of chronic microvascular ischemic change.    No evidence of acute territorial infarct, hemorrhage, mass effect, or midline shift.    Ventricles are normal in size and configuration.    No displaced calvarial fracture.  Hyperostosis frontalis interna.    Minimal frothy opacities in the sphenoid sinus.  Otherwise, the visualized paranasal sinuses and mastoid air cells are essentially clear.  Operative changes in the globes.  Impression: No CT evidence of acute intracranial abnormality.    Generalized cerebral volume loss and chronic ischemic changes.    Electronically signed by: Tra Schaffer MD  Date:    07/29/2023  Time:    00:39  X-Ray Pelvis Routine AP  Narrative: EXAMINATION:  XR PELVIS ROUTINE AP    CLINICAL HISTORY:  buttock pain;    TECHNIQUE:  AP view of the pelvis was performed.    COMPARISON:  None.    FINDINGS:  There is no evidence of an acute fracture or dislocation of the pelvis on this single limited view.  Alignment is normal.  There are degenerative changes.  There are vascular calcifications.  Impression: No acute osseous abnormality.    Electronically signed by: Oscar Gonzalez  Date:    07/29/2023  Time:    00:35  X-Ray Chest 1 View  Narrative: EXAMINATION:  XR CHEST 1 VIEW    CLINICAL HISTORY:  Dizziness and giddiness    TECHNIQUE:  Single frontal view of the chest was performed.    COMPARISON:  None    FINDINGS:  Cardiac silhouette appears mildly enlarged.  Minimal interstitial changes could be associated with mild pulmonary edema.  No large  effusion.  No evidence of pneumothorax.    No mass or consolidation.  No focal infiltrate.    No acute osseous abnormality.    Dextroscoliosis of the thoracic spine.  Impression: Cardiomegaly with mild interstitial changes.  Mild edema is a consideration.  Recommend clinical correlation and follow-up.    Electronically signed by: Wayne Lou  Date:    07/29/2023  Time:    00:25

## 2023-07-29 NOTE — ED NOTES
Med rec completed with help from son via telephone. Pt gave verbal permission as she does not know her home meds.

## 2023-07-29 NOTE — PLAN OF CARE
Problem: Physical Therapy  Goal: Physical Therapy Goal  Description: Goals to be met by: 2023    Patient will increase functional independence with mobility by performin. Sit<>stand with minimal assist with RW.  2. Gait x 50 feet with CGA with RW.  3. Ascend/descend 7 step(s) with least restrictive assistive device and bilateral handrails.      Outcome: Ongoing, Progressing   Patient evaluated by PT and goals established. Patient required maximal assist to stand from chair today. Patient ambulated 5 feet from chair to bed with mod/maximal assist. Very poor safety awareness noted with significant swelling of BLE, stiffness and weakness impacting the patient's gait mechanics.  Discharge recommendation to inpatient rehab facility. Discharge DME includes RW, and BSC. PT will continue to follow and progress as tolerated. Please see progress note for detailed plan of care and recommendations.

## 2023-07-29 NOTE — HOSPITAL COURSE
Patient with hypertensive urgency and fall. She also tested positive for influenza B but has not signs of respiratory illness or systemic infection. Nifedipine started for BP control. PT/OT recommend inpatient rehab. Son, Stephan,  flew in from [place] to assist with discharge planning. (She calls Setphan her adopted son; confirmed by sister Mrs Colin. He and his wife take care of patient). He will chaperone Mrs Pina while she is in town and pursue outpatient rehab when they return to [place]. Patient will use her wheel chair and have maximum assistance while in town. Walker ordered but patient recently received a new one and may not qualify for a replacement.  Discussed importance of daily BP monitoring, along with hold parameters regarding nifedipine. Patient and family are agreeable to discharge plan. All questions answered.

## 2023-07-29 NOTE — PT/OT/SLP EVAL
Physical Therapy Evaluation    Patient Name:  Silvana Sanz   MRN:  [id number]    Recommendations:     Discharge Recommendations: rehabilitation facility   Discharge Equipment Recommendations: bedside commode, walker, rolling   Barriers to discharge: Inaccessible home, Decreased caregiver support, and current functional status     Assessment:     Silvana Sanz is a 89 y.o. female admitted with a medical diagnosis of Hypertensive urgency.  She presents with the following impairments/functional limitations: weakness, decreased safety awareness, impaired balance, impaired endurance, impaired cardiopulmonary response to activity, edema, impaired self care skills, impaired functional mobility, gait instability, decreased ROM, decreased upper extremity function, decreased coordination, impaired muscle length, decreased lower extremity function .     Patient evaluated by PT and goals established. Patient required maximal assist to stand from chair today. Patient ambulated 5 feet from chair to bed with mod/maximal assist. Very poor safety awareness noted with significant swelling of BLE, stiffness and weakness impacting the patient's gait mechanics.  Discharge recommendation to inpatient rehab facility. Discharge DME includes RW, and BSC. PT will continue to follow and progress as tolerated. Please see progress note for detailed plan of care and recommendations.    Rehab Prognosis: Fair; patient would benefit from acute skilled PT services to address these deficits and reach maximum level of function.    Recent Surgery: * No surgery found *      Plan:     During this hospitalization, patient to be seen 5 x/week to address the identified rehab impairments via gait training, therapeutic activities, therapeutic exercises and progress toward the following goals:    Plan of Care Expires:  08/28/23    Subjective     Chief Complaint: that she is confused why she is here and wants to know when she can go home. Would like to return  home as soon as possible.   Patient/Family Comments/goals: Patient agrees to participate in PT intervention.   Pain/Comfort:  Pain Rating 1: 0/10    Patients cultural, spiritual, Mu-ism conflicts given the current situation: no    Living Environment: (patient is likely an inaccurate historian, patient reports her son should be called for more information but unable to find sons contact information)  Patient reports she lives alone in a two story home. Patient reports she uses her cane when going up/down the stairs and uses her rollator when ambulating around the home. Patient reports she was previously able to do everything on her own and had no functional limitations. Patient's friend was in the room throughout evaluation but reports she is unaware of the patient's living situation or functional status prior to coming to the hospital. The friend called Babita who the patient's reports helps care for her. Babita reports she believes the patient has caretakers at home to help her but did not report frequency or duration of caretakers present.   Prior to admission, patients level of function was modified independent.  Equipment used at home: rollator.  DME owned (not currently used): none.  Upon discharge, patient will have assistance from neighbors, and son .    Objective:     Communicated with nursing prior to session.  Patient found supine with PureWick, telemetry, peripheral IV  upon PT entry to room.    General Precautions: Standard, fall  Orthopedic Precautions:N/A   Braces: N/A  Respiratory Status: Room air    Exams:  Cognition:   Patient is oriented to person.  Pt follows approximately 40% of verbal commands.    Mood: Pleasant and cooperative, confused    Safety Awareness: poor   Musculoskeletal:  BMI: 20  Posture:  slouched shoulders, forward head posture   LE ROM/Strength:   R ROM: limited, unable to passively flex knee to facilitate standing position   L ROM: limited, unable to passively flex knee to  facilitate standing position   R Strength:   Knee extension: 3/5  Dorsiflexion: 3/5   L Strength:   Knee extension: 3/5  Dorsiflexion: 3/5   Neuromuscular:  Sensation: Intact to light touch bilateral LEs.   Tone/Reflexes: No impairments identified with functional mobility. No formal testing performed.   Balance:   Static sitting: fair   Static standing: poor  Dynamic standing: poor  Visual-vestibular: No impairments identified with functional mobility. No formal testing performed.  Integument: Visible skin intact  Cardiopulmonary:  Edema: moderate edema noted in BLE, patient's friend reports that is normal for the patient      Functional Mobility:  Bed Mobility:     Sit to Supine: moderate assistance  Transfers:     Sit to Stand:  maximal assistance with rolling walker  Patient not able to tuck BLE under her to assist with stand. Therapist attempts to passively flex knees of patient to assist proper positioning for stand but unable to flex patient's knees likely due to swelling of BLE and stiffness   Gait: Patient ambulated 5 feet from chair to EOB with maximal assist using the RW. Patient side stepped 4 feet along the EOB with moderate assist using the RW      AM-PAC 6 CLICK MOBILITY  Total Score:12       Treatment & Education:  Patient educated on POC, purpose of PT and discharge planning. Extensive education on not getting out of bed on her own, fall risk and use of call button. Reenforcement required but patient eventually verbalizes understanding.     Gait deviations noted: decreased felix, decreased velocity of limb motion, decreased step length, decreased stride length, decreased toe-to-floor clearance, and decreased weight-shifting ability    - very poor safety awareness noted with ambulation. Patient constantly removes hands from RW despite verbal and tactile cues to keep hands on RW at all times.   - patient unable to fully lift BLE off the ground for clearance with ambulation.    Patient left supine  with all lines intact and call button in reach.    GOALS:   Multidisciplinary Problems       Physical Therapy Goals          Problem: Physical Therapy    Goal Priority Disciplines Outcome Goal Variances Interventions   Physical Therapy Goal     PT, PT/OT Ongoing, Progressing     Description: Goals to be met by: 2023    Patient will increase functional independence with mobility by performin. Sit<>stand with minimal assist with RW.  2. Gait x 50 feet with CGA with RW.  3. Ascend/descend 7 step(s) with least restrictive assistive device and bilateral handrails.                           History:     Past Medical History:   Diagnosis Date    Anemia, unspecified     Elevated cholesterol     Hypertension     Hypothyroidism, unspecified        History reviewed. No pertinent surgical history.    Time Tracking:     PT Received On: 23  PT Start Time: 1056     PT Stop Time: 1132  PT Total Time (min): 36 min     Billable Minutes: Evaluation 10, Gait Training 16, and Therapeutic Activity 10      2023

## 2023-07-29 NOTE — PLAN OF CARE
Problem: Occupational Therapy  Goal: Occupational Therapy Goal  Description: Goals to be met by: 8/12/2023     Patient will increase functional independence with ADLs by performing:    UE Dressing while seated with Minimal Assistance.  LE Dressing with Moderate Assistance.  Grooming while seated with Stand-by Assistance.  Toileting from bedside commode with Moderate Assistance for hygiene and clothing management.   Toilet transfer to bedside commode with Moderate Assistance.    Outcome: Ongoing, Progressing     Initial OT eval/treat complete.  Has SPC and rollator; takes sink baths for bathing.  Pt. Lives alone.  Needs BSC and W/C currently though will defer to next level of  care.  Will defer AD needs to PT.  Recommend post acute OT in IRF.  To benefit from continued acute care OT services to increase independence in self-care/functional transfers.  OT to follow.

## 2023-07-29 NOTE — ASSESSMENT & PLAN NOTE
Troponin- .042, no chest pain/discomfort. Dizziness x 48 hours.  Likely due to severe HTN     Trend Troponin

## 2023-07-29 NOTE — PROGRESS NOTES
[facility] Medicine  Progress Note    Patient Name: Silvana Sanz  MRN: [id number]  Patient Class: OP- Observation   Admission Date: 7/28/2023  Length of Stay: 0 days  Attending Physician: Jazz Pratt MD  Primary Care Provider: Primary Doctor No        Subjective:     Principal Problem:Hypertensive urgency        HPI:  The patient is a 89 year old female with a past medical history of hypertension, hyperlipidemia, chronic diastolic congestive heart failure, and SVT visiting from [place] who presents for evaluation after mechanical fall just prior to arrival.  Pt also reports dizziness for last 2 days.  She notes she was getting out the car when she had a ground level fall. She denies hitting her head or loss of consciousness. Patient reports having associated back pain from fall. She denies any recent leg pain or swelling. She notes that she have not been eating due to her trip.  On initial workup, the patient is noted to be severely hypertensive (greater than 200 systolic) and with a mild elevation in troponin.  Of note, she was found to be influenza B positive.      Overview/Hospital Course:  Patient with hypertensive urgency and fall. She also tested positive for influenza B but has not signs of respiratory illness or systemic infection. PT/OT recommend inpatient rehab. Son will be flying in from [place] to assist with discharge planning.       Interval History: patient needs frequent redirecting. She forgets the sequence of events that brought to the hospital. She does report that she fell when she was trying to use her friend's rollator. She denies new pain. Updated Stephan on plan and called sister Babita but did not get an answer.     Review of Systems   Constitutional:  Negative for activity change.   HENT:  Positive for ear pain (chronic external pain behind left ear).    Eyes:  Negative for visual disturbance.   Cardiovascular:  Positive for leg swelling (chronic).    Gastrointestinal: Negative.  Negative for abdominal distention and constipation.   Endocrine: Negative for polyuria.   Genitourinary:  Negative for difficulty urinating.   Musculoskeletal:  Negative for arthralgias.   Neurological:  Negative for dizziness.   Psychiatric/Behavioral:  Positive for confusion.      Objective:     Vital Signs (Most Recent):  Temp: 97.8 °F (36.6 °C) (07/29/23 1200)  Pulse: (!) 56 (07/29/23 1200)  Resp: 17 (07/29/23 1200)  BP: (!) 165/67 (07/29/23 1200)  SpO2: 100 % (07/29/23 1200) Vital Signs (24h Range):  Temp:  [97.6 °F (36.4 °C)-98 °F (36.7 °C)] 97.8 °F (36.6 °C)  Pulse:  [56-72] 56  Resp:  [12-18] 17  SpO2:  [98 %-100 %] 100 %  BP: (134-221)/() 165/67     Weight: 52.4 kg (115 lb 9.6 oz)  Body mass index is 20.48 kg/m².    Intake/Output Summary (Last 24 hours) at 7/29/2023 1421  Last data filed at 7/29/2023 1136  Gross per 24 hour   Intake 1340 ml   Output 100 ml   Net 1240 ml         Physical Exam  Vitals reviewed.   Eyes:      Pupils: Pupils are equal, round, and reactive to light.   Cardiovascular:      Rate and Rhythm: Normal rate.   Pulmonary:      Effort: Pulmonary effort is normal.   Abdominal:      General: Bowel sounds are normal.      Palpations: Abdomen is soft.   Skin:     General: Skin is warm.   Neurological:      General: No focal deficit present.      Mental Status: She is alert. She is disoriented.   Psychiatric:         Mood and Affect: Affect normal.         Cognition and Memory: Memory is impaired.             Significant Labs: All pertinent labs within the past 24 hours have been reviewed.  BMP:   Recent Labs   Lab 07/29/23  0551   *      K 3.4*      CO2 25   BUN 21   CREATININE 1.3   CALCIUM 9.5   MG 1.8     CBC:   Recent Labs   Lab 07/28/23  2324 07/29/23  0551   WBC 3.74* 4.89   HGB 11.9* 11.5*   HCT 37.7 37.2    191     CMP:   Recent Labs   Lab 07/28/23  2324 07/29/23  0551    140   K 4.6 3.4*    104   CO2 21* 25    GLU 87 123*   BUN 21 21   CREATININE 1.3 1.3   CALCIUM 9.8 9.5   PROT 7.8 7.0   ALBUMIN 4.0 3.6   BILITOT 0.4 0.6   ALKPHOS 84 82   AST 22 20   ALT 14 12   ANIONGAP 14 11       Significant Imaging: I have reviewed all pertinent imaging results/findings within the past 24 hours.  CT Cervical Spine Without Contrast  Narrative: EXAMINATION:  CT CERVICAL SPINE WITHOUT CONTRAST    CLINICAL HISTORY:  Neck pain, recent trauma;    TECHNIQUE:  Low dose axial images, sagittal and coronal reformations were performed though the cervical spine.  Contrast was not administered.    COMPARISON:  None.    FINDINGS:    Minimal anterolisthesis of C3 with respect to C4.  Minimal retrolisthesis of C6 with respect to C7. Otherwise, grossly normal sagittal alignment.  Moderate to advanced degenerative changes throughout the cervical spine.  No severe central canal stenosis.  Variable multilevel neural foraminal narrowing.  Multilevel facet fusion in the lower cervical and upper thoracic spine.  Vertebral body heights are relatively well maintained.  No acute displaced fracture identified.  Prevertebral soft tissues are normal.  Lung apices are clear.  Atherosclerosis of the bilateral carotid arteries.  Impression: No acute cervical fracture.    Multilevel degenerative changes in the cervical spine.    Electronically signed by: Tra Schaffer MD  Date:    07/29/2023  Time:    00:54  CT Head Without Contrast  Narrative: EXAMINATION:  CT HEAD WITHOUT CONTRAST    CLINICAL HISTORY:  Head trauma, minor (Age >= 65y);    TECHNIQUE:  Low dose axial images were obtained through the head.  Coronal and sagittal reformations were also performed. Contrast was not administered.    COMPARISON:  None.    FINDINGS:  Generalized cerebral volume loss with ex vacuo dilation of the ventricles and sulci.  Patchy periventricular white matter hypoattenuation suggestive of chronic microvascular ischemic change.    No evidence of acute territorial infarct,  hemorrhage, mass effect, or midline shift.    Ventricles are normal in size and configuration.    No displaced calvarial fracture.  Hyperostosis frontalis interna.    Minimal frothy opacities in the sphenoid sinus.  Otherwise, the visualized paranasal sinuses and mastoid air cells are essentially clear.  Operative changes in the globes.  Impression: No CT evidence of acute intracranial abnormality.    Generalized cerebral volume loss and chronic ischemic changes.    Electronically signed by: Tra Schaffer MD  Date:    07/29/2023  Time:    00:39  X-Ray Pelvis Routine AP  Narrative: EXAMINATION:  XR PELVIS ROUTINE AP    CLINICAL HISTORY:  buttock pain;    TECHNIQUE:  AP view of the pelvis was performed.    COMPARISON:  None.    FINDINGS:  There is no evidence of an acute fracture or dislocation of the pelvis on this single limited view.  Alignment is normal.  There are degenerative changes.  There are vascular calcifications.  Impression: No acute osseous abnormality.    Electronically signed by: Oscar Gonzalez  Date:    07/29/2023  Time:    00:35  X-Ray Chest 1 View  Narrative: EXAMINATION:  XR CHEST 1 VIEW    CLINICAL HISTORY:  Dizziness and giddiness    TECHNIQUE:  Single frontal view of the chest was performed.    COMPARISON:  None    FINDINGS:  Cardiac silhouette appears mildly enlarged.  Minimal interstitial changes could be associated with mild pulmonary edema.  No large effusion.  No evidence of pneumothorax.    No mass or consolidation.  No focal infiltrate.    No acute osseous abnormality.    Dextroscoliosis of the thoracic spine.  Impression: Cardiomegaly with mild interstitial changes.  Mild edema is a consideration.  Recommend clinical correlation and follow-up.    Electronically signed by: Wayne Lou  Date:    07/29/2023  Time:    00:25          Assessment/Plan:      * Hypertensive urgency  Patient has a current diagnosis of hypertensive urgency (without evidence of end organ damage) which is  controlled.  Latest blood pressure and vitals reviewed-   Temp:  [97.6 °F (36.4 °C)-98 °F (36.7 °C)]   Pulse:  [56-72]   Resp:  [12-18]   BP: (134-221)/()   SpO2:  [98 %-100 %] .   Patient currently off IV antihypertensives.   Home meds for hypertension were reviewed and noted below.   Hypertension Medications             furosemide (LASIX) 20 MG tablet Take 20 mg by mouth 3 (three) times a week.    lisinopriL (PRINIVIL,ZESTRIL) 20 MG tablet Take 30 mg by mouth once daily.         metoprolol succinate (TOPROL-XL) 25 MG 24 hr tablet Take 25 mg by mouth once daily.          Medication adjustment for hospital antihypertensives is as follows- Restart home meds    Will aim for controlled BP reduction by medications noted above. Monitor and mitigate end organ damage as indicated.    Improved with home regimen administration    Acquired hypothyroidism  Continue levothyroxine      Elevated troponin  Troponin- .042, no chest pain/discomfort. Dizziness x 48 hours.  Likely due to severe HTN     Trended down.        Influenza B  No signs of illness at this time. Will monitor.   Tamiflu continued        VTE Risk Mitigation (From admission, onward)         Ordered     heparin (porcine) injection 5,000 Units  Every 8 hours         07/29/23 0201     IP VTE HIGH RISK PATIENT  Once         07/29/23 0201     Place sequential compression device  Until discontinued         07/29/23 0201                Discharge Planning   BRANDON:      Code Status: Full Code   Is the patient medically ready for discharge?:     Reason for patient still in hospital (select all that apply): Patient trending condition  Discharge Plan A: Home                  Maggi Perez DNP  Department of Hospital Medicine   [facility])

## 2023-07-29 NOTE — SUBJECTIVE & OBJECTIVE
Past Medical History:   Diagnosis Date    Anemia, unspecified     Elevated cholesterol     Hypertension     Hypothyroidism, unspecified        History reviewed. No pertinent surgical history.    Review of patient's allergies indicates:  No Known Allergies    No current facility-administered medications on file prior to encounter.     Current Outpatient Medications on File Prior to Encounter   Medication Sig    atorvastatin (LIPITOR) 10 MG tablet Take 10 mg by mouth once daily.    ferrous sulfate (FEOSOL) 325 mg (65 mg iron) Tab tablet Take 325 mg by mouth 2 (two) times daily.    furosemide (LASIX) 20 MG tablet Take 20 mg by mouth 3 (three) times a week.    levothyroxine (SYNTHROID) 25 MCG tablet Take 25 mcg by mouth before breakfast.    lisinopriL (PRINIVIL,ZESTRIL) 20 MG tablet Take 30 mg by mouth once daily.    metoprolol succinate (TOPROL-XL) 25 MG 24 hr tablet Take 25 mg by mouth once daily.    vitamin D (VITAMIN D3) 1000 units Tab Take 2,000 Units by mouth once daily.    [DISCONTINUED] lisinopriL-hydrochlorothiazide (PRINZIDE,ZESTORETIC) 20-12.5 mg per tablet Take 1 tablet by mouth once daily.     Family History    Family history is unknown by patient.       Tobacco Use    Smoking status: Never     Passive exposure: Never    Smokeless tobacco: Never   Substance and Sexual Activity    Alcohol use: Not Currently    Drug use: Never    Sexual activity: Never     Review of Systems   Constitutional:  Positive for activity change and fatigue. Negative for appetite change and fever.   HENT:  Negative for congestion, ear pain, rhinorrhea and sinus pressure.    Eyes:  Negative for pain and discharge.   Respiratory:  Negative for cough, chest tightness, shortness of breath and wheezing.    Cardiovascular:  Negative for chest pain and leg swelling.   Gastrointestinal:  Negative for abdominal distention, abdominal pain, diarrhea, nausea and vomiting.   Endocrine: Negative for cold intolerance and heat intolerance.    Genitourinary:  Negative for difficulty urinating, flank pain, frequency, hematuria and urgency.   Musculoskeletal:  Positive for arthralgias and myalgias. Negative for joint swelling.   Allergic/Immunologic: Negative for environmental allergies and food allergies.   Neurological:  Positive for dizziness. Negative for weakness, light-headedness and headaches.   Hematological:  Does not bruise/bleed easily.   Psychiatric/Behavioral:  Negative for agitation, behavioral problems and decreased concentration.      Objective:     Vital Signs (Most Recent):  Temp: 98 °F (36.7 °C) (07/29/23 0313)  Pulse: 63 (07/29/23 0313)  Resp: 17 (07/29/23 0313)  BP: (!) 199/93 (07/29/23 0313)  SpO2: 100 % (07/29/23 0313) Vital Signs (24h Range):  Temp:  [97.8 °F (36.6 °C)-98 °F (36.7 °C)] 98 °F (36.7 °C)  Pulse:  [63-72] 63  Resp:  [12-17] 17  SpO2:  [98 %-100 %] 100 %  BP: (155-221)/() 199/93     Weight: 52.4 kg (115 lb 9.6 oz)  Body mass index is 20.48 kg/m².     Physical Exam  Constitutional:       Appearance: Normal appearance. She is well-developed.   HENT:      Head: Normocephalic.   Eyes:      General:         Right eye: No discharge.         Left eye: No discharge.      Conjunctiva/sclera: Conjunctivae normal.   Cardiovascular:      Rate and Rhythm: Normal rate and regular rhythm.      Pulses:           Radial pulses are 1+ on the right side and 1+ on the left side.      Heart sounds: Normal heart sounds.   Pulmonary:      Effort: Pulmonary effort is normal. No respiratory distress.      Breath sounds: Examination of the left-lower field reveals decreased breath sounds. Decreased breath sounds present.   Abdominal:      General: Bowel sounds are normal. There is no distension.      Palpations: Abdomen is soft.      Tenderness: There is no abdominal tenderness.   Musculoskeletal:         General: Normal range of motion.      Cervical back: Normal range of motion and neck supple.   Skin:     General: Skin is warm and dry.       Coloration: Skin is pale.   Neurological:      Mental Status: She is alert and oriented to person, place, and time.      GCS: GCS eye subscore is 4. GCS verbal subscore is 5. GCS motor subscore is 6.      Motor: Motor function is intact.   Psychiatric:         Mood and Affect: Mood normal.         Speech: Speech normal.         Behavior: Behavior normal.                Significant Labs: All pertinent labs within the past 24 hours have been reviewed.  CBC:   Recent Labs   Lab 07/28/23  2324   WBC 3.74*   HGB 11.9*   HCT 37.7        CMP:   Recent Labs   Lab 07/28/23  2324      K 4.6      CO2 21*   GLU 87   BUN 21   CREATININE 1.3   CALCIUM 9.8   PROT 7.8   ALBUMIN 4.0   BILITOT 0.4   ALKPHOS 84   AST 22   ALT 14   ANIONGAP 14       Significant Imaging: I have reviewed all pertinent imaging results/findings within the past 24 hours.

## 2023-07-29 NOTE — PROGRESS NOTES
Pharmacist Renal Dose Adjustment Note    Silvana Sanz is a 89 y.o. female being treated with the medication oseltamivir    Patient Data:    Vital Signs (Most Recent):  Temp: 97.9 °F (36.6 °C) (07/29/23 0433)  Pulse: 62 (07/29/23 0600)  Resp: 16 (07/29/23 0433)  BP: (!) 161/72 (07/29/23 0542)  SpO2: 100 % (07/29/23 0433) Vital Signs (72h Range):  Temp:  [97.8 °F (36.6 °C)-98 °F (36.7 °C)]   Pulse:  [60-72]   Resp:  [12-17]   BP: (155-221)/()   SpO2:  [98 %-100 %]      Recent Labs   Lab 07/28/23 2324 07/29/23  0551   CREATININE 1.3 1.3     Serum creatinine: 1.3 mg/dL 07/29/23 0551  Estimated creatinine clearance: 19 mL/min    Medication: oseltamivir dose: 75 mg frequency daily will be changed to medication: oseltamivir dose: 30 mg frequency: daily    Pharmacist's Name: Erin Mora  Pharmacist's Extension: [id number]

## 2023-07-29 NOTE — HPI
The patient is a 89 year old female with a past medical history of hypertension, hyperlipidemia, chronic diastolic congestive heart failure, and SVT visiting from [place] who presents for evaluation after mechanical fall just prior to arrival.  Pt also reports dizziness for last 2 days.  She notes she was getting out the car when she had a ground level fall. She denies hitting her head or loss of consciousness. Patient reports having associated back pain from fall. She denies any recent leg pain or swelling. She notes that she have not been eating due to her trip.  On initial workup, the patient is noted to be severely hypertensive (greater than 200 systolic) and with a mild elevation in troponin.  Of note, she was found to be influenza B positive.

## 2023-07-29 NOTE — PLAN OF CARE
Patient's BP reassessed 30 minutes after administration of hydralazine 5mg IV and reduced to 158/71. Patient denies relief from PO acetaminophen at this time.       Problem: Adult Inpatient Plan of Care  Goal: Plan of Care Review  Outcome: Ongoing, Progressing  Goal: Optimal Comfort and Wellbeing  Outcome: Ongoing, Progressing  Goal: Readiness for Transition of Care  Outcome: Ongoing, Progressing

## 2023-07-29 NOTE — NURSING
Nurses Note -- 4 Eyes      7/29/2023   4:09 AM      Skin assessed during: Admit      [x] No Altered Skin Integrity Present    [x]Prevention Measures Documented      [] Yes- Altered Skin Integrity Present or Discovered   [] LDA Added if Not in Epic (Describe Wound)   [] New Altered Skin Integrity was Present on Admit and Documented in LDA   [] Wound Image Taken    Wound Care Consulted? No    Attending Nurse:  Amelia Murphy RN     Second RN/Staff Member:  MANPREET Desai

## 2023-07-29 NOTE — ASSESSMENT & PLAN NOTE
Patient has a current diagnosis of hypertensive urgency (without evidence of end organ damage) which is controlled.  Latest blood pressure and vitals reviewed-   Temp:  [97.6 °F (36.4 °C)-98 °F (36.7 °C)]   Pulse:  [56-72]   Resp:  [12-18]   BP: (134-221)/()   SpO2:  [98 %-100 %] .   Patient currently off IV antihypertensives.   Home meds for hypertension were reviewed and noted below.   Hypertension Medications             furosemide (LASIX) 20 MG tablet Take 20 mg by mouth 3 (three) times a week.    lisinopriL (PRINIVIL,ZESTRIL) 20 MG tablet Take 30 mg by mouth once daily.         metoprolol succinate (TOPROL-XL) 25 MG 24 hr tablet Take 25 mg by mouth once daily.          Medication adjustment for hospital antihypertensives is as follows- Restart home meds    Will aim for controlled BP reduction by medications noted above. Monitor and mitigate end organ damage as indicated.    Improved with home regimen administration

## 2023-07-29 NOTE — NURSING
Patient admitted from the ED for hypertensive emergency. BP on arrival 199/93. Patient concurrently complaining of headache localized to the brow region and left neck pain. NP notified, and 5 mg of hydralazine administered. Cardiac monitoring initiated. Patient is on contact and droplet precautions for influenza B. Remaining vitals are stable on room air. Patient passed bedside swallow with RN. Skin is intact. Patient oriented to room and call light use. Female external catheter is in place. Fall precautions initiated at this time.

## 2023-07-29 NOTE — ASSESSMENT & PLAN NOTE
Patient has a current diagnosis of hypertensive urgency (without evidence of end organ damage) which is controlled.  Latest blood pressure and vitals reviewed-   Temp:  [97.8 °F (36.6 °C)-98 °F (36.7 °C)]   Pulse:  [63-72]   Resp:  [12-17]   BP: (155-221)/()   SpO2:  [98 %-100 %] .   Patient currently off IV antihypertensives.   Home meds for hypertension were reviewed and noted below.   Hypertension Medications             furosemide (LASIX) 20 MG tablet Take 20 mg by mouth 3 (three) times a week.    lisinopriL (PRINIVIL,ZESTRIL) 20 MG tablet Take 30 mg by mouth once daily.         metoprolol succinate (TOPROL-XL) 25 MG 24 hr tablet Take 25 mg by mouth once daily.          Medication adjustment for hospital antihypertensives is as follows- Restart home meds    Will aim for controlled BP reduction by medications noted above. Monitor and mitigate end organ damage as indicated.

## 2023-07-29 NOTE — PT/OT/SLP EVAL
Occupational Therapy   Evaluation and Treatment    Name: Silvana Sanz  MRN: [id number]  Admitting Diagnosis: Hypertensive urgency  Recent Surgery: * No surgery found *      Recommendations:     Discharge Recommendations: rehabilitation facility  Discharge Equipment Recommendations:  bedside commode, wheelchair (to defer AD toPT)  Barriers to discharge:  Inaccessible home environment, Decreased caregiver support (current functional level)    Assessment:   Initial OT eval/treat complete.  Currently requires MOD A for bed mobility and MAX A for bed<>chair transfers with increased lift/lower assist as well as HOHA for safe hand placement; attempted verbal cuing for safe hand placement initially though without follow through - Pt. Is [place].  Able to feed self with setup this day while EOB and seated in bedside chair.  SBA and retrieval of needed items to wash hands/face while seated in bedside chair.  Previously MOD I with ADL and plays piano each Sunday at [employer].  Will require intense interdisciplinary therapy services to return safely to previous level of function and prior living situation.  Has SPC and rollator; takes sink baths for bathing.  Pt. Lives alone.  Needs BSC and W/C currently though will defer to next level of  care.  Will defer AD needs to PT.  Recommend post acute OT in IRF.  To benefit from continued acute care OT services to increase independence in self-care/functional transfers.  OT to follow.      Silvana Sanz is a 89 y.o. female with a medical diagnosis of Hypertensive urgency.  She presents with below deficits decreasing independence in self-care/functional transfers. Performance deficits affecting function: weakness, impaired endurance, impaired self care skills, impaired functional mobility, decreased coordination, gait instability, decreased upper extremity function, decreased lower extremity function, impaired balance, decreased safety awareness, decreased ROM.      Rehab Prognosis: Good;  patient would benefit from acute skilled OT services to address these deficits and reach maximum level of function.       Plan:     Patient to be seen 5 x/week to address the above listed problems via self-care/home management, therapeutic activities, therapeutic exercises  Plan of Care Expires: 08/12/23  Plan of Care Reviewed with: patient    Subjective     Chief Complaint: No c/o pain.   Patient/Family Comments/goals: No goals stated at this time.     Occupational Profile:  Lives alone in [facility] with 7 JACKELYN and B-handrails; bathroom set up as tub/shower combo.  Pt. Takes sink baths and ambulates using cane in community (unsure if SPC or QC) and rollator in home.  MOD I with ADL and reports that she cooks.  Friends and family take her grocery shopping in which she uses cart for stability.    Equipment Used at Home: rollator (also has cane but did not specify SPC or QC)  Assistance upon Discharge: Pt. Lives alone.  Unsure of level of assist that family can provide.       Pain/Comfort:  Pain Rating 1: 0/10  Pain Rating Post-Intervention 1: 0/10    Patients cultural, spiritual, Worship conflicts given the current situation:  (None stated.)    Objective:     Communicated with: Alexandra M. Fahrenholtz, RN prior to session.  Patient found HOB elevated with peripheral IV, telemetry, PureWick upon OT entry to room.    General Precautions: Standard, fall, droplet, contact (Knik)  Orthopedic Precautions: N/A  Braces: N/A  Respiratory Status: Room air    Occupational Performance:    Bed Mobility:    2837-4343:  Supine>sit with MOD A for trunk and LLE management for completely getting to EOB.  Increased cues for scooting forward and verbal cuing for safe hand placement  to initiate task.  9780-9145:  Sit>supine with MOD A for RLE management and cuing needed to initiate task.      Functional Mobility/Transfers:  9867-5200:  Stand-pivot transfer bed>bedside chair with MAX A and no AD used with increased assist with  lift/lower/pivot; requiring increased instruction for task sequencing including HOHA for safe hand transitions from bed as Pt. Gripping bed rail extremely tight during transfer and not following verbal cues to let go.  Also requiring increased instruction on safe task sequencing.    9854-1443:  Stand-pivot transfer bedside chair>bed with MAX A and no AD used with increased assist with lift/lower/pivot; attempting to manually assist with increasing knee flexion for stand though unable at this time.      Activities of Daily Livin9513-0187:  Setup with food tray along with opening packages though Pt. Able to cut meat and use RUE-hand to self-feed while EOB as well as while up in chair.  2429-5665:  Setup for hand hygiene and washing face while seated in bedside chair.      Cognitive/Visual Perceptual:  Cognitive/Psychosocial Skills:  -       Oriented to: Person and Situation   -       Follows Commands/attention:Follows one-step commands and easily distracted; also [place]  -       Communication: able to make basic needs known and answer questions appropriately though is also [place]  -       Memory: Deficits noted  -       Safety awareness/insight to disability: impaired   -       Mood/Affect/Coping skills/emotional control: Cooperative  Visual/Perceptual:  -grossly intact    Physical Exam:  Postural examination/scapula alignment: -       Rounded shoulders  -       Forward head  -       forward flexed trunk  Upper Extremity Range of Motion:  -       Right Upper Extremity: WFL except approx. 75% of shoulder flex  -       Left Upper Extremity: WFL except approx. 75% of shoulder flex  Upper Extremity Strength: -       Right Upper Extremity: 3+/5 gross except for 3-/5 at shoulders  -       Left Upper Extremity: 3+/5 gross except for 3-/5 at shoulder   Strength: -       Right Upper Extremity: WFL  -       Left Upper Extremity: WFL  Fine Motor Coordination: -       Intact  Left hand, manipulation of objects and Right hand,  manipulation of objects    [facility] 6 Click ADL:  AMPA Total Score: 12    Treatment & Education:  Educated on role of OT and POC.   7645-3801:  Supine>sit with MOD A for trunk and LLE management for completely getting to EOB.  Increased cues for scooting forward and verbal cuing for safe hand placement  to initiate task.  Stand-pivot transfer bed>bedside chair with MAX A and no AD used with increased assist with lift/lower/pivot; requiring increased instruction for task sequencing including HOHA for safe hand transitions from bed as Pt. Gripping bed rail extremely tight during transfer and not following verbal cues to let go.  Also requiring increased instruction on safe task sequencing.  Setup with food tray along with opening packages though Pt. Able to cut meat and use RUE-hand to self-feed while EOB as well as while up in chair.  4058-0407:  Sit>supine with MOD A for RLE management and cuing needed to initiate task.  Stand-pivot transfer bedside chair>bed with MAX A and no AD used with increased assist with lift/lower/pivot; attempting to manually assist with increasing knee flexion for stand though unable at this time.  Setup for hand hygiene and washing face while seated in bedside chair.    Received call light review and importance of calling for assist as needed.     3033-3586:  Patient left seated in bedside chair with all lines intact, call button in reach, nursing notified, and friend present    9825-4515:  Patient left HOB elevated with all lines intact, bed alarm on, call button in reach and nursing notified.         GOALS:   Multidisciplinary Problems       Occupational Therapy Goals          Problem: Occupational Therapy    Goal Priority Disciplines Outcome Interventions   Occupational Therapy Goal     OT, PT/OT Ongoing, Progressing    Description: Goals to be met by: 8/12/2023     Patient will increase functional independence with ADLs by performing:    UE Dressing while seated with Minimal  Assistance.  LE Dressing with Moderate Assistance.  Grooming while seated with Stand-by Assistance.  Toileting from bedside commode with Moderate Assistance for hygiene and clothing management.   Toilet transfer to bedside commode with Moderate Assistance.                         History:     Past Medical History:   Diagnosis Date    Anemia, unspecified     Elevated cholesterol     Hypertension     Hypothyroidism, unspecified        History reviewed. No pertinent surgical history.    Time Tracking:     OT Date of Treatment: 07/29/23  OT Start Time: 1407 (1453)  OT Stop Time: 1432 (1512)  OT Total Time (min): 25 min (19 min)    Billable Minutes:Evaluation 10  Self Care/Home Management 10  Therapeutic Activity 24    7/29/2023

## 2023-07-29 NOTE — H&P
[facility] Medicine  History & Physical    Patient Name: Silvana Sanz  MRN: [id number]  Patient Class: OP- Observation  Admission Date: 7/28/2023  Attending Physician: Jazz Pratt MD   Primary Care Provider: Primary Doctor No         Patient information was obtained from patient, past medical records and ER records.     Subjective:     Principal Problem:Hypertensive emergency    Chief Complaint:   Chief Complaint   Patient presents with    Dizziness     Pt had a ground level trip and fall, pt denies loss of consciousness - pt is complaining of dizziness post fall, per ems pt claims to have been dizzy x2 days prior - lower back pain         HPI: The patient is a 89 year old female with a past medical history of hypertension, hyperlipidemia, chronic diastolic congestive heart failure, and SVT visiting from [place] who presents for evaluation after mechanical fall just prior to arrival.  Pt also reports dizziness for last 2 days.  She notes she was getting out the car when she had a ground level fall. She denies hitting her head or loss of consciousness. Patient reports having associated back pain from fall. She denies any recent leg pain or swelling. She notes that she have not been eating due to her trip.  On initial workup, the patient is noted to be severely hypertensive (greater than 200 systolic) and with a mild elevation in troponin.  Of note, she was found to be influenza B positive.      Past Medical History:   Diagnosis Date    Anemia, unspecified     Elevated cholesterol     Hypertension     Hypothyroidism, unspecified        History reviewed. No pertinent surgical history.    Review of patient's allergies indicates:  No Known Allergies    No current facility-administered medications on file prior to encounter.     Current Outpatient Medications on File Prior to Encounter   Medication Sig    atorvastatin (LIPITOR) 10 MG tablet Take 10 mg by mouth once daily.     ferrous sulfate (FEOSOL) 325 mg (65 mg iron) Tab tablet Take 325 mg by mouth 2 (two) times daily.    furosemide (LASIX) 20 MG tablet Take 20 mg by mouth 3 (three) times a week.    levothyroxine (SYNTHROID) 25 MCG tablet Take 25 mcg by mouth before breakfast.    lisinopriL (PRINIVIL,ZESTRIL) 20 MG tablet Take 30 mg by mouth once daily.    metoprolol succinate (TOPROL-XL) 25 MG 24 hr tablet Take 25 mg by mouth once daily.    vitamin D (VITAMIN D3) 1000 units Tab Take 2,000 Units by mouth once daily.    [DISCONTINUED] lisinopriL-hydrochlorothiazide (PRINZIDE,ZESTORETIC) 20-12.5 mg per tablet Take 1 tablet by mouth once daily.     Family History    Family history is unknown by patient.       Tobacco Use    Smoking status: Never     Passive exposure: Never    Smokeless tobacco: Never   Substance and Sexual Activity    Alcohol use: Not Currently    Drug use: Never    Sexual activity: Never     Review of Systems   Constitutional:  Positive for activity change and fatigue. Negative for appetite change and fever.   HENT:  Negative for congestion, ear pain, rhinorrhea and sinus pressure.    Eyes:  Negative for pain and discharge.   Respiratory:  Negative for cough, chest tightness, shortness of breath and wheezing.    Cardiovascular:  Negative for chest pain and leg swelling.   Gastrointestinal:  Negative for abdominal distention, abdominal pain, diarrhea, nausea and vomiting.   Endocrine: Negative for cold intolerance and heat intolerance.   Genitourinary:  Negative for difficulty urinating, flank pain, frequency, hematuria and urgency.   Musculoskeletal:  Positive for arthralgias and myalgias. Negative for joint swelling.   Allergic/Immunologic: Negative for environmental allergies and food allergies.   Neurological:  Positive for dizziness. Negative for weakness, light-headedness and headaches.   Hematological:  Does not bruise/bleed easily.   Psychiatric/Behavioral:  Negative for agitation, behavioral problems  and decreased concentration.      Objective:     Vital Signs (Most Recent):  Temp: 98 °F (36.7 °C) (07/29/23 0313)  Pulse: 63 (07/29/23 0313)  Resp: 17 (07/29/23 0313)  BP: (!) 199/93 (07/29/23 0313)  SpO2: 100 % (07/29/23 0313) Vital Signs (24h Range):  Temp:  [97.8 °F (36.6 °C)-98 °F (36.7 °C)] 98 °F (36.7 °C)  Pulse:  [63-72] 63  Resp:  [12-17] 17  SpO2:  [98 %-100 %] 100 %  BP: (155-221)/() 199/93     Weight: 52.4 kg (115 lb 9.6 oz)  Body mass index is 20.48 kg/m².     Physical Exam  Constitutional:       Appearance: Normal appearance. She is well-developed.   HENT:      Head: Normocephalic.   Eyes:      General:         Right eye: No discharge.         Left eye: No discharge.      Conjunctiva/sclera: Conjunctivae normal.   Cardiovascular:      Rate and Rhythm: Normal rate and regular rhythm.      Pulses:           Radial pulses are 1+ on the right side and 1+ on the left side.      Heart sounds: Normal heart sounds.   Pulmonary:      Effort: Pulmonary effort is normal. No respiratory distress.      Breath sounds: Examination of the left-lower field reveals decreased breath sounds. Decreased breath sounds present.   Abdominal:      General: Bowel sounds are normal. There is no distension.      Palpations: Abdomen is soft.      Tenderness: There is no abdominal tenderness.   Musculoskeletal:         General: Normal range of motion.      Cervical back: Normal range of motion and neck supple.   Skin:     General: Skin is warm and dry.      Coloration: Skin is pale.   Neurological:      Mental Status: She is alert and oriented to person, place, and time.      GCS: GCS eye subscore is 4. GCS verbal subscore is 5. GCS motor subscore is 6.      Motor: Motor function is intact.   Psychiatric:         Mood and Affect: Mood normal.         Speech: Speech normal.         Behavior: Behavior normal.                Significant Labs: All pertinent labs within the past 24 hours have been reviewed.  CBC:   Recent Labs    Lab 07/28/23  2324   WBC 3.74*   HGB 11.9*   HCT 37.7        CMP:   Recent Labs   Lab 07/28/23  2324      K 4.6      CO2 21*   GLU 87   BUN 21   CREATININE 1.3   CALCIUM 9.8   PROT 7.8   ALBUMIN 4.0   BILITOT 0.4   ALKPHOS 84   AST 22   ALT 14   ANIONGAP 14       Significant Imaging: I have reviewed all pertinent imaging results/findings within the past 24 hours.    Assessment/Plan:     * Hypertensive emergency  Patient has a current diagnosis of hypertensive urgency (without evidence of end organ damage) which is controlled.  Latest blood pressure and vitals reviewed-   Temp:  [97.8 °F (36.6 °C)-98 °F (36.7 °C)]   Pulse:  [63-72]   Resp:  [12-17]   BP: (155-221)/()   SpO2:  [98 %-100 %] .   Patient currently off IV antihypertensives.   Home meds for hypertension were reviewed and noted below.   Hypertension Medications             furosemide (LASIX) 20 MG tablet Take 20 mg by mouth 3 (three) times a week.    lisinopriL (PRINIVIL,ZESTRIL) 20 MG tablet Take 30 mg by mouth once daily.         metoprolol succinate (TOPROL-XL) 25 MG 24 hr tablet Take 25 mg by mouth once daily.          Medication adjustment for hospital antihypertensives is as follows- Restart home meds    Will aim for controlled BP reduction by medications noted above. Monitor and mitigate end organ damage as indicated.    Acquired hypothyroidism  Continue levothyroxine      Elevated troponin  Troponin- .042, no chest pain/discomfort. Dizziness x 48 hours.  Likely due to severe HTN     Trend Troponin        Influenza B  Noted.    Tamiflu continued        VTE Risk Mitigation (From admission, onward)         Ordered     heparin (porcine) injection 5,000 Units  Every 8 hours         07/29/23 0201     IP VTE HIGH RISK PATIENT  Once         07/29/23 0201     Place sequential compression device  Until discontinued         07/29/23 0201                     Mundo Taveras NP  Department of Hospital Medicine  [facility]  ([place])

## 2023-07-29 NOTE — ASSESSMENT & PLAN NOTE
Troponin- .042, no chest pain/discomfort. Dizziness x 48 hours.  Likely due to severe HTN     Trended down.

## 2023-07-29 NOTE — PLAN OF CARE
SW spoke Patient caregivers & friends discussing patient discharge dispo.   (Babita friend) & (Stephan caregiver)    Patient in the [facility] for a [employer] event. During the even patient got dizzy fell causing her to come to  the hospital.          07/29/23 0852   Discharge Assessment   Assessment Type Discharge Planning Assessment   Confirmed/corrected address, phone number and insurance Yes   Confirmed Demographics Correct on Facesheet   Source of Information family;health record   People in Home other relative(s)   Facility Arrived From: [facility]   Do you expect to return to your current living situation? No   Do you have help at home or someone to help you manage your care at home? Yes   Prior to hospitilization cognitive status: Unable to Assess   Current cognitive status: Unable to Assess   Walking or Climbing Stairs ambulation difficulty, requires equipment   Dressing/Bathing bathing difficulty, requires equipment   Equipment Currently Used at Home rollator   Readmission within 30 days? No   Patient currently being followed by outpatient case management? No   Do you currently have service(s) that help you manage your care at home? No   Do you take prescription medications? Yes   Do you have prescription coverage? Yes   Do you have any problems affording any of your prescribed medications? No   Is the patient taking medications as prescribed? yes   How do you get to doctors appointments? family or friend will provide;health plan transportation   Are you on dialysis? No   Do you take coumadin? No   Discharge Plan A Home   DME Needed Upon Discharge  none   Discharge Plan discussed with: Caregiver;Friend   Transition of Care Barriers None

## 2023-07-30 LAB
ALBUMIN SERPL BCP-MCNC: 3.4 G/DL (ref 3.5–5.2)
ALP SERPL-CCNC: 77 U/L (ref 55–135)
ALT SERPL W/O P-5'-P-CCNC: 11 U/L (ref 10–44)
ANION GAP SERPL CALC-SCNC: 10 MMOL/L (ref 8–16)
AST SERPL-CCNC: 16 U/L (ref 10–40)
BASOPHILS # BLD AUTO: 0.03 K/UL (ref 0–0.2)
BASOPHILS NFR BLD: 0.8 % (ref 0–1.9)
BILIRUB SERPL-MCNC: 0.5 MG/DL (ref 0.1–1)
BNP SERPL-MCNC: 279 PG/ML (ref 0–99)
BUN SERPL-MCNC: 26 MG/DL (ref 10–30)
CALCIUM SERPL-MCNC: 9.2 MG/DL (ref 8.7–10.5)
CHLORIDE SERPL-SCNC: 104 MMOL/L (ref 95–110)
CO2 SERPL-SCNC: 24 MMOL/L (ref 23–29)
CREAT SERPL-MCNC: 1.2 MG/DL (ref 0.5–1.4)
DIFFERENTIAL METHOD: ABNORMAL
EOSINOPHIL # BLD AUTO: 0.1 K/UL (ref 0–0.5)
EOSINOPHIL NFR BLD: 1.8 % (ref 0–8)
ERYTHROCYTE [DISTWIDTH] IN BLOOD BY AUTOMATED COUNT: 15.5 % (ref 11.5–14.5)
EST. GFR  (NO RACE VARIABLE): 40 ML/MIN/1.73 M^2
GLUCOSE SERPL-MCNC: 79 MG/DL (ref 70–110)
HCT VFR BLD AUTO: 34.2 % (ref 37–48.5)
HGB BLD-MCNC: 10.7 G/DL (ref 12–16)
IMM GRANULOCYTES # BLD AUTO: 0.01 K/UL (ref 0–0.04)
IMM GRANULOCYTES NFR BLD AUTO: 0.3 % (ref 0–0.5)
LYMPHOCYTES # BLD AUTO: 1.1 K/UL (ref 1–4.8)
LYMPHOCYTES NFR BLD: 29.2 % (ref 18–48)
MAGNESIUM SERPL-MCNC: 1.7 MG/DL (ref 1.6–2.6)
MCH RBC QN AUTO: 27.6 PG (ref 27–31)
MCHC RBC AUTO-ENTMCNC: 31.3 G/DL (ref 32–36)
MCV RBC AUTO: 88 FL (ref 82–98)
MONOCYTES # BLD AUTO: 0.7 K/UL (ref 0.3–1)
MONOCYTES NFR BLD: 19.3 % (ref 4–15)
NEUTROPHILS # BLD AUTO: 1.9 K/UL (ref 1.8–7.7)
NEUTROPHILS NFR BLD: 48.6 % (ref 38–73)
NRBC BLD-RTO: 0 /100 WBC
PHOSPHATE SERPL-MCNC: 3.9 MG/DL (ref 2.7–4.5)
PLATELET # BLD AUTO: 184 K/UL (ref 150–450)
PMV BLD AUTO: 10.3 FL (ref 9.2–12.9)
POTASSIUM SERPL-SCNC: 4 MMOL/L (ref 3.5–5.1)
PROT SERPL-MCNC: 6.7 G/DL (ref 6–8.4)
RBC # BLD AUTO: 3.88 M/UL (ref 4–5.4)
SODIUM SERPL-SCNC: 138 MMOL/L (ref 136–145)
WBC # BLD AUTO: 3.83 K/UL (ref 3.9–12.7)

## 2023-07-30 PROCEDURE — 84100 ASSAY OF PHOSPHORUS: CPT | Performed by: NURSE PRACTITIONER

## 2023-07-30 PROCEDURE — 96376 TX/PRO/DX INJ SAME DRUG ADON: CPT

## 2023-07-30 PROCEDURE — 96375 TX/PRO/DX INJ NEW DRUG ADDON: CPT

## 2023-07-30 PROCEDURE — 25000003 PHARM REV CODE 250: Performed by: NURSE PRACTITIONER

## 2023-07-30 PROCEDURE — 99233 PR SUBSEQUENT HOSPITAL CARE,LEVL III: ICD-10-PCS | Mod: ,,, | Performed by: NURSE PRACTITIONER

## 2023-07-30 PROCEDURE — G0378 HOSPITAL OBSERVATION PER HR: HCPCS

## 2023-07-30 PROCEDURE — 83880 ASSAY OF NATRIURETIC PEPTIDE: CPT | Performed by: NURSE PRACTITIONER

## 2023-07-30 PROCEDURE — 96372 THER/PROPH/DIAG INJ SC/IM: CPT | Performed by: NURSE PRACTITIONER

## 2023-07-30 PROCEDURE — 83735 ASSAY OF MAGNESIUM: CPT | Performed by: NURSE PRACTITIONER

## 2023-07-30 PROCEDURE — 99233 SBSQ HOSP IP/OBS HIGH 50: CPT | Mod: ,,, | Performed by: NURSE PRACTITIONER

## 2023-07-30 PROCEDURE — 80053 COMPREHEN METABOLIC PANEL: CPT | Performed by: NURSE PRACTITIONER

## 2023-07-30 PROCEDURE — 85025 COMPLETE CBC W/AUTO DIFF WBC: CPT | Performed by: NURSE PRACTITIONER

## 2023-07-30 PROCEDURE — 63600175 PHARM REV CODE 636 W HCPCS: Performed by: NURSE PRACTITIONER

## 2023-07-30 PROCEDURE — 25000003 PHARM REV CODE 250: Performed by: HOSPITALIST

## 2023-07-30 RX ORDER — FUROSEMIDE 10 MG/ML
40 INJECTION INTRAMUSCULAR; INTRAVENOUS ONCE
Status: COMPLETED | OUTPATIENT
Start: 2023-07-30 | End: 2023-07-30

## 2023-07-30 RX ORDER — FUROSEMIDE 20 MG/1
20 TABLET ORAL DAILY
Status: DISCONTINUED | OUTPATIENT
Start: 2023-07-31 | End: 2023-07-31

## 2023-07-30 RX ORDER — POTASSIUM CHLORIDE 20 MEQ/1
20 TABLET, EXTENDED RELEASE ORAL DAILY
Status: COMPLETED | OUTPATIENT
Start: 2023-07-30 | End: 2023-07-31

## 2023-07-30 RX ORDER — NIFEDIPINE 30 MG/1
30 TABLET, EXTENDED RELEASE ORAL DAILY
Status: DISCONTINUED | OUTPATIENT
Start: 2023-07-30 | End: 2023-07-30

## 2023-07-30 RX ADMIN — HEPARIN SODIUM 5000 UNITS: 5000 INJECTION INTRAVENOUS; SUBCUTANEOUS at 05:07

## 2023-07-30 RX ADMIN — METOPROLOL SUCCINATE 25 MG: 25 TABLET, EXTENDED RELEASE ORAL at 09:07

## 2023-07-30 RX ADMIN — FERROUS SULFATE TAB 325 MG (65 MG ELEMENTAL FE) 1 EACH: 325 (65 FE) TAB at 08:07

## 2023-07-30 RX ADMIN — HEPARIN SODIUM 5000 UNITS: 5000 INJECTION INTRAVENOUS; SUBCUTANEOUS at 02:07

## 2023-07-30 RX ADMIN — POTASSIUM CHLORIDE 20 MEQ: 1500 TABLET, EXTENDED RELEASE ORAL at 11:07

## 2023-07-30 RX ADMIN — Medication 2000 UNITS: at 09:07

## 2023-07-30 RX ADMIN — HYDRALAZINE HYDROCHLORIDE 5 MG: 20 INJECTION INTRAMUSCULAR; INTRAVENOUS at 01:07

## 2023-07-30 RX ADMIN — LISINOPRIL 30 MG: 20 TABLET ORAL at 09:07

## 2023-07-30 RX ADMIN — LEVOTHYROXINE SODIUM 25 MCG: 25 TABLET ORAL at 05:07

## 2023-07-30 RX ADMIN — HYDRALAZINE HYDROCHLORIDE 5 MG: 20 INJECTION INTRAMUSCULAR; INTRAVENOUS at 08:07

## 2023-07-30 RX ADMIN — ATORVASTATIN CALCIUM 10 MG: 10 TABLET, FILM COATED ORAL at 09:07

## 2023-07-30 RX ADMIN — FUROSEMIDE 40 MG: 10 INJECTION, SOLUTION INTRAMUSCULAR; INTRAVENOUS at 09:07

## 2023-07-30 RX ADMIN — OSELTAMIVIR PHOSPHATE 30 MG: 6 POWDER, FOR SUSPENSION ORAL at 09:07

## 2023-07-30 RX ADMIN — ACETAMINOPHEN 650 MG: 325 TABLET, FILM COATED ORAL at 11:07

## 2023-07-30 RX ADMIN — HEPARIN SODIUM 5000 UNITS: 5000 INJECTION INTRAVENOUS; SUBCUTANEOUS at 10:07

## 2023-07-30 RX ADMIN — FERROUS SULFATE TAB 325 MG (65 MG ELEMENTAL FE) 1 EACH: 325 (65 FE) TAB at 09:07

## 2023-07-30 NOTE — ASSESSMENT & PLAN NOTE
Patient has a current diagnosis of hypertensive urgency (without evidence of end organ damage) which is controlled.  Latest blood pressure and vitals reviewed-   Temp:  [97.6 °F (36.4 °C)-98.2 °F (36.8 °C)]   Pulse:  [56-69]   Resp:  [16-18]   BP: (134-218)/()   SpO2:  [97 %-100 %] .   Patient currently off IV antihypertensives.   Home meds for hypertension were reviewed and noted below.   Hypertension Medications             furosemide (LASIX) 20 MG tablet Take 20 mg by mouth 3 (three) times a week.    lisinopriL (PRINIVIL,ZESTRIL) 20 MG tablet Take 30 mg by mouth once daily.         metoprolol succinate (TOPROL-XL) 25 MG 24 hr tablet Take 25 mg by mouth once daily.          Medication adjustment for hospital antihypertensives is as follows- Restart home meds    Will aim for controlled BP reduction by medications noted above. Monitor and mitigate end organ damage as indicated.    No significant improvement. Patient requiring PRN hydralazine. Will diuresis due to BNP of 279 and adjust BP medications afterwards.

## 2023-07-30 NOTE — SUBJECTIVE & OBJECTIVE
Interval History: Patient oriented to self and place. She is worried about being alone after discharge. Awaiting her caretaker Stephan to discuss discharge plans.    Review of Systems   Constitutional:  Negative for activity change.   Respiratory:  Negative for chest tightness and shortness of breath.    Cardiovascular:  Positive for leg swelling. Negative for chest pain.   Gastrointestinal:  Negative for abdominal distention and nausea.   Musculoskeletal: Negative.    Neurological:  Negative for weakness.     Objective:     Vital Signs (Most Recent):  Temp: 98.2 °F (36.8 °C) (07/30/23 0900)  Pulse: 67 (07/30/23 0900)  Resp: 16 (07/30/23 0900)  BP: (!) 175/85 (07/30/23 0900)  SpO2: 100 % (07/30/23 0900) Vital Signs (24h Range):  Temp:  [97.6 °F (36.4 °C)-98.2 °F (36.8 °C)] 98.2 °F (36.8 °C)  Pulse:  [56-69] 67  Resp:  [16-18] 16  SpO2:  [97 %-100 %] 100 %  BP: (134-218)/() 175/85     Weight: 52.4 kg (115 lb 9.6 oz)  Body mass index is 20.48 kg/m².    Intake/Output Summary (Last 24 hours) at 7/30/2023 1042  Last data filed at 7/30/2023 0439  Gross per 24 hour   Intake 240 ml   Output 1150 ml   Net -910 ml         Physical Exam  Vitals reviewed.   Cardiovascular:      Rate and Rhythm: Normal rate.   Pulmonary:      Effort: Pulmonary effort is normal.      Breath sounds: No wheezing.   Musculoskeletal:      Cervical back: Normal range of motion.      Right lower leg: Edema (2+) present.      Left lower leg: Edema (2+) present.   Neurological:      General: No focal deficit present.      Mental Status: She is alert.   Psychiatric:         Mood and Affect: Mood normal.         Speech: Speech normal.         Behavior: Behavior is cooperative.         Cognition and Memory: Memory is impaired.             Significant Labs: All pertinent labs within the past 24 hours have been reviewed.  BMP:   Recent Labs   Lab 07/30/23  0536   GLU 79      K 4.0      CO2 24   BUN 26   CREATININE 1.2   CALCIUM 9.2   MG 1.7      CBC:   Recent Labs   Lab 07/28/23  2324 07/29/23  0551 07/30/23  0536   WBC 3.74* 4.89 3.83*   HGB 11.9* 11.5* 10.7*   HCT 37.7 37.2 34.2*    191 184     CMP:   Recent Labs   Lab 07/28/23  2324 07/29/23  0551 07/30/23  0536    140 138   K 4.6 3.4* 4.0    104 104   CO2 21* 25 24   GLU 87 123* 79   BUN 21 21 26   CREATININE 1.3 1.3 1.2   CALCIUM 9.8 9.5 9.2   PROT 7.8 7.0 6.7   ALBUMIN 4.0 3.6 3.4*   BILITOT 0.4 0.6 0.5   ALKPHOS 84 82 77   AST 22 20 16   ALT 14 12 11   ANIONGAP 14 11 10       Significant Imaging: I have reviewed all pertinent imaging results/findings within the past 24 hours.  CT Cervical Spine Without Contrast  Narrative: EXAMINATION:  CT CERVICAL SPINE WITHOUT CONTRAST    CLINICAL HISTORY:  Neck pain, recent trauma;    TECHNIQUE:  Low dose axial images, sagittal and coronal reformations were performed though the cervical spine.  Contrast was not administered.    COMPARISON:  None.    FINDINGS:    Minimal anterolisthesis of C3 with respect to C4.  Minimal retrolisthesis of C6 with respect to C7. Otherwise, grossly normal sagittal alignment.  Moderate to advanced degenerative changes throughout the cervical spine.  No severe central canal stenosis.  Variable multilevel neural foraminal narrowing.  Multilevel facet fusion in the lower cervical and upper thoracic spine.  Vertebral body heights are relatively well maintained.  No acute displaced fracture identified.  Prevertebral soft tissues are normal.  Lung apices are clear.  Atherosclerosis of the bilateral carotid arteries.  Impression: No acute cervical fracture.    Multilevel degenerative changes in the cervical spine.    Electronically signed by: Tra Schaffer MD  Date:    07/29/2023  Time:    00:54  CT Head Without Contrast  Narrative: EXAMINATION:  CT HEAD WITHOUT CONTRAST    CLINICAL HISTORY:  Head trauma, minor (Age >= 65y);    TECHNIQUE:  Low dose axial images were obtained through the head.  Coronal and sagittal  reformations were also performed. Contrast was not administered.    COMPARISON:  None.    FINDINGS:  Generalized cerebral volume loss with ex vacuo dilation of the ventricles and sulci.  Patchy periventricular white matter hypoattenuation suggestive of chronic microvascular ischemic change.    No evidence of acute territorial infarct, hemorrhage, mass effect, or midline shift.    Ventricles are normal in size and configuration.    No displaced calvarial fracture.  Hyperostosis frontalis interna.    Minimal frothy opacities in the sphenoid sinus.  Otherwise, the visualized paranasal sinuses and mastoid air cells are essentially clear.  Operative changes in the globes.  Impression: No CT evidence of acute intracranial abnormality.    Generalized cerebral volume loss and chronic ischemic changes.    Electronically signed by: Tra Schaffer MD  Date:    07/29/2023  Time:    00:39  X-Ray Pelvis Routine AP  Narrative: EXAMINATION:  XR PELVIS ROUTINE AP    CLINICAL HISTORY:  buttock pain;    TECHNIQUE:  AP view of the pelvis was performed.    COMPARISON:  None.    FINDINGS:  There is no evidence of an acute fracture or dislocation of the pelvis on this single limited view.  Alignment is normal.  There are degenerative changes.  There are vascular calcifications.  Impression: No acute osseous abnormality.    Electronically signed by: Oscar Gonzalez  Date:    07/29/2023  Time:    00:35  X-Ray Chest 1 View  Narrative: EXAMINATION:  XR CHEST 1 VIEW    CLINICAL HISTORY:  Dizziness and giddiness    TECHNIQUE:  Single frontal view of the chest was performed.    COMPARISON:  None    FINDINGS:  Cardiac silhouette appears mildly enlarged.  Minimal interstitial changes could be associated with mild pulmonary edema.  No large effusion.  No evidence of pneumothorax.    No mass or consolidation.  No focal infiltrate.    No acute osseous abnormality.    Dextroscoliosis of the thoracic spine.  Impression: Cardiomegaly with mild  interstitial changes.  Mild edema is a consideration.  Recommend clinical correlation and follow-up.    Electronically signed by: Wayne Lou  Date:    07/29/2023  Time:    00:25

## 2023-07-30 NOTE — ASSESSMENT & PLAN NOTE
Patient is prescribed 20 mg furosemide every other day. Her chest xray reported mild edema with cardiomegaly. BNP over 200. Gave one time dose 40 mg iv lasix and change lasix to 20 mg daily due to BLLE and persistently elevated BP.

## 2023-07-30 NOTE — PLAN OF CARE
Patient is alert and oriented x 3, able to make needs and wants known. Patient is on contact and droplet precautions for influenza B. Patient's bp elevated to 200's / 100's overnight, blood pressure responded well to prn hydralazine. No c/o blurred vision or headache with elevated bp. Remaining vitals are stable on room air. Patient's skin is intact. Miriam care completed PRN, external female catheter is in place. Fall precautions maintained. Bed is in the lowest position, bed wheels are locked, call light is within reach. Purposeful rounding completed overnight.       Problem: Adult Inpatient Plan of Care  Goal: Plan of Care Review  Outcome: Ongoing, Progressing  Goal: Absence of Hospital-Acquired Illness or Injury  Outcome: Ongoing, Progressing  Goal: Optimal Comfort and Wellbeing  Outcome: Ongoing, Progressing  Goal: Readiness for Transition of Care  Outcome: Ongoing, Progressing

## 2023-07-30 NOTE — PROGRESS NOTES
[facility] Medicine  Progress Note    Patient Name: Silvana Sanz  MRN: [id number]  Patient Class: OP- Observation   Admission Date: 7/28/2023  Length of Stay: 0 days  Attending Physician: Jazz Pratt MD  Primary Care Provider: Primary Doctor No        Subjective:     Principal Problem:Hypertensive urgency        HPI:  The patient is a 89 year old female with a past medical history of hypertension, hyperlipidemia, chronic diastolic congestive heart failure, and SVT visiting from [place] who presents for evaluation after mechanical fall just prior to arrival.  Pt also reports dizziness for last 2 days.  She notes she was getting out the car when she had a ground level fall. She denies hitting her head or loss of consciousness. Patient reports having associated back pain from fall. She denies any recent leg pain or swelling. She notes that she have not been eating due to her trip.  On initial workup, the patient is noted to be severely hypertensive (greater than 200 systolic) and with a mild elevation in troponin.  Of note, she was found to be influenza B positive.      Overview/Hospital Course:  Patient with hypertensive urgency and fall. She also tested positive for influenza B but has not signs of respiratory illness or systemic infection. PT/OT recommend inpatient rehab. Son will be flying in from [place] to assist with discharge planning.       Interval History: Patient oriented to self and place. She is worried about being alone after discharge. Awaiting her caretaker Stephan to discuss discharge plans.    Review of Systems   Constitutional:  Negative for activity change.   Respiratory:  Negative for chest tightness and shortness of breath.    Cardiovascular:  Positive for leg swelling. Negative for chest pain.   Gastrointestinal:  Negative for abdominal distention and nausea.   Musculoskeletal: Negative.    Neurological:  Negative for weakness.     Objective:     Vital Signs  (Most Recent):  Temp: 98.2 °F (36.8 °C) (07/30/23 0900)  Pulse: 67 (07/30/23 0900)  Resp: 16 (07/30/23 0900)  BP: (!) 175/85 (07/30/23 0900)  SpO2: 100 % (07/30/23 0900) Vital Signs (24h Range):  Temp:  [97.6 °F (36.4 °C)-98.2 °F (36.8 °C)] 98.2 °F (36.8 °C)  Pulse:  [56-69] 67  Resp:  [16-18] 16  SpO2:  [97 %-100 %] 100 %  BP: (134-218)/() 175/85     Weight: 52.4 kg (115 lb 9.6 oz)  Body mass index is 20.48 kg/m².    Intake/Output Summary (Last 24 hours) at 7/30/2023 1042  Last data filed at 7/30/2023 0439  Gross per 24 hour   Intake 240 ml   Output 1150 ml   Net -910 ml         Physical Exam  Vitals reviewed.   Cardiovascular:      Rate and Rhythm: Normal rate.   Pulmonary:      Effort: Pulmonary effort is normal.      Breath sounds: No wheezing.   Musculoskeletal:      Cervical back: Normal range of motion.      Right lower leg: Edema (2+) present.      Left lower leg: Edema (2+) present.   Neurological:      General: No focal deficit present.      Mental Status: She is alert.   Psychiatric:         Mood and Affect: Mood normal.         Speech: Speech normal.         Behavior: Behavior is cooperative.         Cognition and Memory: Memory is impaired.             Significant Labs: All pertinent labs within the past 24 hours have been reviewed.  BMP:   Recent Labs   Lab 07/30/23  0536   GLU 79      K 4.0      CO2 24   BUN 26   CREATININE 1.2   CALCIUM 9.2   MG 1.7     CBC:   Recent Labs   Lab 07/28/23 2324 07/29/23  0551 07/30/23  0536   WBC 3.74* 4.89 3.83*   HGB 11.9* 11.5* 10.7*   HCT 37.7 37.2 34.2*    191 184     CMP:   Recent Labs   Lab 07/28/23 2324 07/29/23 0551 07/30/23  0536    140 138   K 4.6 3.4* 4.0    104 104   CO2 21* 25 24   GLU 87 123* 79   BUN 21 21 26   CREATININE 1.3 1.3 1.2   CALCIUM 9.8 9.5 9.2   PROT 7.8 7.0 6.7   ALBUMIN 4.0 3.6 3.4*   BILITOT 0.4 0.6 0.5   ALKPHOS 84 82 77   AST 22 20 16   ALT 14 12 11   ANIONGAP 14 11 10       Significant Imaging: I  have reviewed all pertinent imaging results/findings within the past 24 hours.  CT Cervical Spine Without Contrast  Narrative: EXAMINATION:  CT CERVICAL SPINE WITHOUT CONTRAST    CLINICAL HISTORY:  Neck pain, recent trauma;    TECHNIQUE:  Low dose axial images, sagittal and coronal reformations were performed though the cervical spine.  Contrast was not administered.    COMPARISON:  None.    FINDINGS:    Minimal anterolisthesis of C3 with respect to C4.  Minimal retrolisthesis of C6 with respect to C7. Otherwise, grossly normal sagittal alignment.  Moderate to advanced degenerative changes throughout the cervical spine.  No severe central canal stenosis.  Variable multilevel neural foraminal narrowing.  Multilevel facet fusion in the lower cervical and upper thoracic spine.  Vertebral body heights are relatively well maintained.  No acute displaced fracture identified.  Prevertebral soft tissues are normal.  Lung apices are clear.  Atherosclerosis of the bilateral carotid arteries.  Impression: No acute cervical fracture.    Multilevel degenerative changes in the cervical spine.    Electronically signed by: Tra Schaffer MD  Date:    07/29/2023  Time:    00:54  CT Head Without Contrast  Narrative: EXAMINATION:  CT HEAD WITHOUT CONTRAST    CLINICAL HISTORY:  Head trauma, minor (Age >= 65y);    TECHNIQUE:  Low dose axial images were obtained through the head.  Coronal and sagittal reformations were also performed. Contrast was not administered.    COMPARISON:  None.    FINDINGS:  Generalized cerebral volume loss with ex vacuo dilation of the ventricles and sulci.  Patchy periventricular white matter hypoattenuation suggestive of chronic microvascular ischemic change.    No evidence of acute territorial infarct, hemorrhage, mass effect, or midline shift.    Ventricles are normal in size and configuration.    No displaced calvarial fracture.  Hyperostosis frontalis interna.    Minimal frothy opacities in the sphenoid  sinus.  Otherwise, the visualized paranasal sinuses and mastoid air cells are essentially clear.  Operative changes in the globes.  Impression: No CT evidence of acute intracranial abnormality.    Generalized cerebral volume loss and chronic ischemic changes.    Electronically signed by: Tra Schaffer MD  Date:    07/29/2023  Time:    00:39  X-Ray Pelvis Routine AP  Narrative: EXAMINATION:  XR PELVIS ROUTINE AP    CLINICAL HISTORY:  buttock pain;    TECHNIQUE:  AP view of the pelvis was performed.    COMPARISON:  None.    FINDINGS:  There is no evidence of an acute fracture or dislocation of the pelvis on this single limited view.  Alignment is normal.  There are degenerative changes.  There are vascular calcifications.  Impression: No acute osseous abnormality.    Electronically signed by: Oscar Gonzalez  Date:    07/29/2023  Time:    00:35  X-Ray Chest 1 View  Narrative: EXAMINATION:  XR CHEST 1 VIEW    CLINICAL HISTORY:  Dizziness and giddiness    TECHNIQUE:  Single frontal view of the chest was performed.    COMPARISON:  None    FINDINGS:  Cardiac silhouette appears mildly enlarged.  Minimal interstitial changes could be associated with mild pulmonary edema.  No large effusion.  No evidence of pneumothorax.    No mass or consolidation.  No focal infiltrate.    No acute osseous abnormality.    Dextroscoliosis of the thoracic spine.  Impression: Cardiomegaly with mild interstitial changes.  Mild edema is a consideration.  Recommend clinical correlation and follow-up.    Electronically signed by: Wayne Lou  Date:    07/29/2023  Time:    00:25          Assessment/Plan:      * Hypertensive urgency  Patient has a current diagnosis of hypertensive urgency (without evidence of end organ damage) which is controlled.  Latest blood pressure and vitals reviewed-   Temp:  [97.6 °F (36.4 °C)-98.2 °F (36.8 °C)]   Pulse:  [56-69]   Resp:  [16-18]   BP: (134-218)/()   SpO2:  [97 %-100 %] .   Patient currently off IV  antihypertensives.   Home meds for hypertension were reviewed and noted below.   Hypertension Medications             furosemide (LASIX) 20 MG tablet Take 20 mg by mouth 3 (three) times a week.    lisinopriL (PRINIVIL,ZESTRIL) 20 MG tablet Take 30 mg by mouth once daily.         metoprolol succinate (TOPROL-XL) 25 MG 24 hr tablet Take 25 mg by mouth once daily.          Medication adjustment for hospital antihypertensives is as follows- Restart home meds    Will aim for controlled BP reduction by medications noted above. Monitor and mitigate end organ damage as indicated.    No significant improvement. Patient requiring PRN hydralazine. Will diuresis due to BNP of 279 and adjust BP medications afterwards.     Chronic diastolic heart failure  Patient is prescribed 20 mg furosemide every other day. Her chest xray reported mild edema with cardiomegaly. BNP over 200. Gave one time dose 40 mg iv lasix and change lasix to 20 mg daily due to BLLE and persistently elevated BP.       Acquired hypothyroidism  Continue levothyroxine      Elevated troponin  Troponin- .042, no chest pain/discomfort. Dizziness x 48 hours.  Likely due to severe HTN     Trended down.        Influenza B  No signs of illness at this time. Will monitor.   Tamiflu continued        VTE Risk Mitigation (From admission, onward)         Ordered     heparin (porcine) injection 5,000 Units  Every 8 hours         07/29/23 0201     IP VTE HIGH RISK PATIENT  Once         07/29/23 0201     Place sequential compression device  Until discontinued         07/29/23 0201                Discharge Planning   BRANDON:      Code Status: Full Code   Is the patient medically ready for discharge?:     Reason for patient still in hospital (select all that apply): Patient trending condition  Discharge Plan A: Home                  Maggi Perez DNP  Department of Hospital Medicine   [facility])

## 2023-07-31 VITALS
BODY MASS INDEX: 20.49 KG/M2 | WEIGHT: 115.63 LBS | OXYGEN SATURATION: 100 % | RESPIRATION RATE: 16 BRPM | TEMPERATURE: 98 F | HEIGHT: 63 IN | HEART RATE: 76 BPM | DIASTOLIC BLOOD PRESSURE: 65 MMHG | SYSTOLIC BLOOD PRESSURE: 131 MMHG

## 2023-07-31 LAB
ALBUMIN SERPL BCP-MCNC: 3.6 G/DL (ref 3.5–5.2)
ALP SERPL-CCNC: 81 U/L (ref 55–135)
ALT SERPL W/O P-5'-P-CCNC: 13 U/L (ref 10–44)
ANION GAP SERPL CALC-SCNC: 13 MMOL/L (ref 8–16)
AST SERPL-CCNC: 21 U/L (ref 10–40)
BASOPHILS # BLD AUTO: 0.03 K/UL (ref 0–0.2)
BASOPHILS NFR BLD: 0.7 % (ref 0–1.9)
BILIRUB SERPL-MCNC: 0.4 MG/DL (ref 0.1–1)
BUN SERPL-MCNC: 32 MG/DL (ref 10–30)
CALCIUM SERPL-MCNC: 9.5 MG/DL (ref 8.7–10.5)
CHLORIDE SERPL-SCNC: 99 MMOL/L (ref 95–110)
CO2 SERPL-SCNC: 22 MMOL/L (ref 23–29)
CREAT SERPL-MCNC: 1.5 MG/DL (ref 0.5–1.4)
DIFFERENTIAL METHOD: ABNORMAL
EOSINOPHIL # BLD AUTO: 0.1 K/UL (ref 0–0.5)
EOSINOPHIL NFR BLD: 2.4 % (ref 0–8)
ERYTHROCYTE [DISTWIDTH] IN BLOOD BY AUTOMATED COUNT: 15.8 % (ref 11.5–14.5)
EST. GFR  (NO RACE VARIABLE): 31 ML/MIN/1.73 M^2
GLUCOSE SERPL-MCNC: 75 MG/DL (ref 70–110)
HCT VFR BLD AUTO: 36 % (ref 37–48.5)
HGB BLD-MCNC: 11.2 G/DL (ref 12–16)
IMM GRANULOCYTES # BLD AUTO: 0.01 K/UL (ref 0–0.04)
IMM GRANULOCYTES NFR BLD AUTO: 0.2 % (ref 0–0.5)
LYMPHOCYTES # BLD AUTO: 1.1 K/UL (ref 1–4.8)
LYMPHOCYTES NFR BLD: 25.8 % (ref 18–48)
MAGNESIUM SERPL-MCNC: 1.7 MG/DL (ref 1.6–2.6)
MCH RBC QN AUTO: 27.1 PG (ref 27–31)
MCHC RBC AUTO-ENTMCNC: 31.1 G/DL (ref 32–36)
MCV RBC AUTO: 87 FL (ref 82–98)
MONOCYTES # BLD AUTO: 0.7 K/UL (ref 0.3–1)
MONOCYTES NFR BLD: 15.8 % (ref 4–15)
NEUTROPHILS # BLD AUTO: 2.3 K/UL (ref 1.8–7.7)
NEUTROPHILS NFR BLD: 55.1 % (ref 38–73)
NRBC BLD-RTO: 0 /100 WBC
PHOSPHATE SERPL-MCNC: 3.8 MG/DL (ref 2.7–4.5)
PLATELET # BLD AUTO: 211 K/UL (ref 150–450)
PMV BLD AUTO: 10.6 FL (ref 9.2–12.9)
POTASSIUM SERPL-SCNC: 4.6 MMOL/L (ref 3.5–5.1)
PROT SERPL-MCNC: 7.2 G/DL (ref 6–8.4)
RBC # BLD AUTO: 4.14 M/UL (ref 4–5.4)
SODIUM SERPL-SCNC: 134 MMOL/L (ref 136–145)
WBC # BLD AUTO: 4.23 K/UL (ref 3.9–12.7)

## 2023-07-31 PROCEDURE — 25000003 PHARM REV CODE 250: Performed by: NURSE PRACTITIONER

## 2023-07-31 PROCEDURE — 99239 HOSP IP/OBS DSCHRG MGMT >30: CPT | Mod: ,,, | Performed by: NURSE PRACTITIONER

## 2023-07-31 PROCEDURE — 25000003 PHARM REV CODE 250: Performed by: HOSPITALIST

## 2023-07-31 PROCEDURE — 97110 THERAPEUTIC EXERCISES: CPT | Mod: CQ

## 2023-07-31 PROCEDURE — 80053 COMPREHEN METABOLIC PANEL: CPT | Performed by: NURSE PRACTITIONER

## 2023-07-31 PROCEDURE — 63600175 PHARM REV CODE 636 W HCPCS: Performed by: NURSE PRACTITIONER

## 2023-07-31 PROCEDURE — 84100 ASSAY OF PHOSPHORUS: CPT | Performed by: NURSE PRACTITIONER

## 2023-07-31 PROCEDURE — 97535 SELF CARE MNGMENT TRAINING: CPT

## 2023-07-31 PROCEDURE — 85025 COMPLETE CBC W/AUTO DIFF WBC: CPT | Performed by: NURSE PRACTITIONER

## 2023-07-31 PROCEDURE — 99239 PR HOSPITAL DISCHARGE DAY,>30 MIN: ICD-10-PCS | Mod: ,,, | Performed by: NURSE PRACTITIONER

## 2023-07-31 PROCEDURE — 97116 GAIT TRAINING THERAPY: CPT | Mod: CQ

## 2023-07-31 PROCEDURE — 96376 TX/PRO/DX INJ SAME DRUG ADON: CPT

## 2023-07-31 PROCEDURE — 36415 COLL VENOUS BLD VENIPUNCTURE: CPT | Performed by: NURSE PRACTITIONER

## 2023-07-31 PROCEDURE — 96372 THER/PROPH/DIAG INJ SC/IM: CPT | Performed by: NURSE PRACTITIONER

## 2023-07-31 PROCEDURE — 83735 ASSAY OF MAGNESIUM: CPT | Performed by: NURSE PRACTITIONER

## 2023-07-31 PROCEDURE — G0378 HOSPITAL OBSERVATION PER HR: HCPCS

## 2023-07-31 RX ORDER — NIFEDIPINE 30 MG/1
30 TABLET, EXTENDED RELEASE ORAL DAILY
Qty: 30 TABLET | Refills: 0 | Status: SHIPPED | OUTPATIENT
Start: 2023-08-01 | End: 2024-07-31

## 2023-07-31 RX ORDER — NIFEDIPINE 30 MG/1
30 TABLET, EXTENDED RELEASE ORAL DAILY
Status: DISCONTINUED | OUTPATIENT
Start: 2023-07-31 | End: 2023-07-31 | Stop reason: HOSPADM

## 2023-07-31 RX ORDER — NIFEDIPINE 30 MG/1
30 TABLET, EXTENDED RELEASE ORAL DAILY
Status: DISCONTINUED | OUTPATIENT
Start: 2023-07-31 | End: 2023-07-31

## 2023-07-31 RX ADMIN — HEPARIN SODIUM 5000 UNITS: 5000 INJECTION INTRAVENOUS; SUBCUTANEOUS at 06:07

## 2023-07-31 RX ADMIN — LISINOPRIL 30 MG: 20 TABLET ORAL at 09:07

## 2023-07-31 RX ADMIN — ATORVASTATIN CALCIUM 10 MG: 10 TABLET, FILM COATED ORAL at 09:07

## 2023-07-31 RX ADMIN — OSELTAMIVIR PHOSPHATE 30 MG: 6 POWDER, FOR SUSPENSION ORAL at 11:07

## 2023-07-31 RX ADMIN — HEPARIN SODIUM 5000 UNITS: 5000 INJECTION INTRAVENOUS; SUBCUTANEOUS at 02:07

## 2023-07-31 RX ADMIN — NIFEDIPINE 30 MG: 30 TABLET, FILM COATED, EXTENDED RELEASE ORAL at 09:07

## 2023-07-31 RX ADMIN — Medication 2000 UNITS: at 09:07

## 2023-07-31 RX ADMIN — FERROUS SULFATE TAB 325 MG (65 MG ELEMENTAL FE) 1 EACH: 325 (65 FE) TAB at 09:07

## 2023-07-31 RX ADMIN — HYDRALAZINE HYDROCHLORIDE 5 MG: 20 INJECTION INTRAMUSCULAR; INTRAVENOUS at 07:07

## 2023-07-31 RX ADMIN — LEVOTHYROXINE SODIUM 25 MCG: 25 TABLET ORAL at 06:07

## 2023-07-31 RX ADMIN — POTASSIUM CHLORIDE 20 MEQ: 1500 TABLET, EXTENDED RELEASE ORAL at 09:07

## 2023-07-31 RX ADMIN — METOPROLOL SUCCINATE 25 MG: 25 TABLET, EXTENDED RELEASE ORAL at 09:07

## 2023-07-31 NOTE — DISCHARGE SUMMARY
[facility] Medicine  Discharge Summary      Patient Name: Silvana Sanz  MRN: [id number]  SIS: [id number]  Patient Class: OP- Observation  Admission Date: 7/28/2023  Hospital Length of Stay: 0 days  Discharge Date and Time:  07/31/2023 2:09 PM  Attending Physician: Jazz Pratt MD   Discharging Provider: Maggi Perez DNP  Primary Care Provider: Primary Doctor Michelle    Primary Care Team: Networked reference to record PCT     HPI:   The patient is a 89 year old female with a past medical history of hypertension, hyperlipidemia, chronic diastolic congestive heart failure, and SVT visiting from [place] who presents for evaluation after mechanical fall just prior to arrival.  Pt also reports dizziness for last 2 days.  She notes she was getting out the car when she had a ground level fall. She denies hitting her head or loss of consciousness. Patient reports having associated back pain from fall. She denies any recent leg pain or swelling. She notes that she have not been eating due to her trip.  On initial workup, the patient is noted to be severely hypertensive (greater than 200 systolic) and with a mild elevation in troponin.  Of note, she was found to be influenza B positive.      * No surgery found *      Hospital Course:   Patient with hypertensive urgency and fall. She also tested positive for influenza B but has not signs of respiratory illness or systemic infection. Nifedipine started for BP control. PT/OT recommend inpatient rehab. Son, Stephan,  flew in from [place] to assist with discharge planning. (She calls Stephan her adopted son; confirmed by sister Mrs Colin. He and his wife take care of patient). He will chaperone Mrs Pina while she is in town and pursue outpatient rehab when they return to [place]. Patient will use her wheel chair and have maximum assistance while in town. Walker ordered but patient recently received a new one and may not qualify for a replacement.   Discussed importance of daily BP monitoring, along with hold parameters regarding nifedipine. Patient and family are agreeable to discharge plan. All questions answered.        Goals of Care Treatment Preferences:  Code Status: Full Code      Consults:     Cardiac/Vascular  * Hypertensive urgency  Patient has a current diagnosis of hypertensive urgency (without evidence of end organ damage) which is controlled.  Latest blood pressure and vitals reviewed-   Temp:  [97.5 °F (36.4 °C)-99 °F (37.2 °C)]   Pulse:  [67-76]   Resp:  [15-20]   BP: (131-178)/(63-85)   SpO2:  [99 %-100 %] .   Patient currently off IV antihypertensives.   Home meds for hypertension were reviewed and noted below.   Hypertension Medications             furosemide (LASIX) 20 MG tablet Take 20 mg by mouth 3 (three) times a week.    lisinopriL (PRINIVIL,ZESTRIL) 20 MG tablet Take 30 mg by mouth once daily.         metoprolol succinate (TOPROL-XL) 25 MG 24 hr tablet Take 25 mg by mouth once daily.          Medication adjustment for hospital antihypertensives is as follows- Restart home meds    Will aim for controlled BP reduction by medications noted above. Monitor and mitigate end organ damage as indicated.    No significant improvement. Patient requiring PRN hydralazine. Will diuresis due to BNP of 279 and adjust BP medications afterwards. IV lasix offered limited improvement. Nifedipine ordered.       Final Active Diagnoses:    Diagnosis Date Noted POA    PRINCIPAL PROBLEM:  Hypertensive urgency [I16.0] 07/29/2023 Yes    Chronic diastolic heart failure [I50.32] 01/08/2014 Yes    Elevated troponin [R77.8] 07/29/2023 Yes    Acquired hypothyroidism [E03.9] 07/29/2023 Yes    Influenza B [J10.1] 07/29/2023 Yes      Problems Resolved During this Admission:       Discharged Condition: good    Disposition: Home or Self Care    Follow Up:   Follow-up Information     Primary Doctor No Follow up.    Why: Follow up with primary care physician within one  "week of discharge. Call to make an appointment.                     Patient Instructions:      WALKER FOR HOME USE     Order Specific Question Answer Comments   Type of Walker: Bobo (4'4"-5'6")    With wheels? Yes    Height: 5' 3" (1.6 m)    Weight: 52.4 kg (115 lb 9.6 oz)    Length of need (1-99 months): 99    Does patient have medical equipment at home? rollator also has cane but did not specify SPC or QC   Please check all that apply: Patient's condition impairs ambulation.    Please check all that apply: Patient is unable to safely ambulate without equipment.    Please check all that apply: Patient needs help to get in and out of chair.      Ambulatory referral/consult to Physical/Occupational Therapy   Standing Status: Future   Referral Priority: Routine Referral Type: Physical Medicine   Referral Reason: Specialty Services Required   Number of Visits Requested: 1     Diet Cardiac     Notify your health care provider if you experience any of the following:  temperature >100.4     Notify your health care provider if you experience any of the following:  persistent nausea and vomiting or diarrhea     Notify your health care provider if you experience any of the following:  redness, tenderness, or signs of infection (pain, swelling, redness, odor or green/yellow discharge around incision site)     Activity as tolerated       Significant Diagnostic Studies: N/A    Pending Diagnostic Studies:     Procedure Component Value Units Date/Time    CT Lumbar Spine Without Contrast [[id number]] Resulted: 07/29/23 0005    Order Status: Sent Lab Status: In process Updated: 07/29/23 0041         Medications:  Reconciled Home Medications:      Medication List      START taking these medications    NIFEdipine 30 MG (OSM) 24 hr tablet  Commonly known as: PROCARDIA-XL  Take 1 tablet (30 mg total) by mouth once daily. Hold if systolic blood pressure is less than 140  Start taking on: August 1, 2023        CONTINUE taking these " medications    atorvastatin 10 MG tablet  Commonly known as: LIPITOR  Take 10 mg by mouth once daily.     ferrous sulfate 325 mg (65 mg iron) Tab tablet  Commonly known as: FEOSOL  Take 325 mg by mouth 2 (two) times daily.     furosemide 20 MG tablet  Commonly known as: LASIX  Take 20 mg by mouth 3 (three) times a week.     levothyroxine 25 MCG tablet  Commonly known as: SYNTHROID  Take 25 mcg by mouth before breakfast.     lisinopriL 20 MG tablet  Commonly known as: PRINIVIL,ZESTRIL  Take 30 mg by mouth once daily.     metoprolol succinate 25 MG 24 hr tablet  Commonly known as: TOPROL-XL  Take 25 mg by mouth once daily.     vitamin D 1000 units Tab  Commonly known as: VITAMIN D3  Take 2,000 Units by mouth once daily.            Indwelling Lines/Drains at time of discharge:   Lines/Drains/Airways     Drain  Duration           Female External Urinary Catheter 07/29/23 0400 2 days                Time spent on the discharge of patient: 63 minutes         Maggi Perez DNP  Department of Hospital Medicine  [facility] ([place])

## 2023-07-31 NOTE — DISCHARGE INSTRUCTIONS
Monitor blood pressure at the same time every day. A new blood pressure medication called nifedipine has been started. If your blood pressure is below 140, do not take the nifedipine. Follow up with your primary care physician within one week of hospital discharge. A referral for outpatient therapy has been placed. Make sure you have assistance with ambulation and physical tasks, especially while you are in a new environment.

## 2023-07-31 NOTE — ASSESSMENT & PLAN NOTE
Patient has a current diagnosis of hypertensive urgency (without evidence of end organ damage) which is controlled.  Latest blood pressure and vitals reviewed-   Temp:  [97.5 °F (36.4 °C)-99 °F (37.2 °C)]   Pulse:  [67-76]   Resp:  [15-20]   BP: (131-178)/(63-85)   SpO2:  [99 %-100 %] .   Patient currently off IV antihypertensives.   Home meds for hypertension were reviewed and noted below.   Hypertension Medications             furosemide (LASIX) 20 MG tablet Take 20 mg by mouth 3 (three) times a week.    lisinopriL (PRINIVIL,ZESTRIL) 20 MG tablet Take 30 mg by mouth once daily.         metoprolol succinate (TOPROL-XL) 25 MG 24 hr tablet Take 25 mg by mouth once daily.          Medication adjustment for hospital antihypertensives is as follows- Restart home meds    Will aim for controlled BP reduction by medications noted above. Monitor and mitigate end organ damage as indicated.    No significant improvement. Patient requiring PRN hydralazine. Will diuresis due to BNP of 279 and adjust BP medications afterwards. IV lasix offered limited improvement. Nifedipine ordered.

## 2023-07-31 NOTE — PLAN OF CARE
Problem: Adult Inpatient Plan of Care  Goal: Plan of Care Review  Outcome: Met  Goal: Patient-Specific Goal (Individualized)  Outcome: Met  Goal: Absence of Hospital-Acquired Illness or Injury  Outcome: Met  Goal: Optimal Comfort and Wellbeing  Outcome: Met  Goal: Readiness for Transition of Care  Outcome: Met     Problem: Infection  Goal: Absence of Infection Signs and Symptoms  Outcome: Met     Problem: Fall Injury Risk  Goal: Absence of Fall and Fall-Related Injury  Outcome: Met     Problem: Skin Injury Risk Increased  Goal: Skin Health and Integrity  Outcome: Met     Adequate For Discharge

## 2023-07-31 NOTE — PLAN OF CARE
POC reviewed with patient awake and alert.  Purposeful rounding completed. No significant events this shift. VS and assessment per flowsheets. Complaints of pain treated with PRN pain medication according to MAR.  No other complaints verbalized this shift. No injuries, falls, or trauma occurred during shift. Bed low and locked, with side rails up x3, personal belongings and call light placed within easy reach. Safety maintained throughout shift      Problem: Adult Inpatient Plan of Care  Goal: Plan of Care Review  7/31/2023 0410 by Vanesa Veloz RN  Outcome: Ongoing, Progressing     Problem: Adult Inpatient Plan of Care  Goal: Patient-Specific Goal (Individualized)  7/31/2023 0410 by Vanesa Veloz RN  Outcome: Ongoing, Progressing     Problem: Adult Inpatient Plan of Care  Goal: Absence of Hospital-Acquired Illness or Injury  7/31/2023 0410 by Vanesa Veloz RN  Outcome: Ongoing, Progressing     Problem: Adult Inpatient Plan of Care  Goal: Optimal Comfort and Wellbeing  7/31/2023 0410 by Vanesa Veloz RN  Outcome: Ongoing, Progressing     Problem: Adult Inpatient Plan of Care  Goal: Readiness for Transition of Care  7/31/2023 0410 by Vanesa Veloz RN  Outcome: Ongoing, Progressing     Problem: Infection  Goal: Absence of Infection Signs and Symptoms  7/31/2023 0410 by Vanesa Veloz RN  Outcome: Ongoing, Progressing     Problem: Fall Injury Risk  Goal: Absence of Fall and Fall-Related Injury  Outcome: Ongoing, Progressing

## 2023-07-31 NOTE — PLAN OF CARE
07/31/23 1416   Final Note   Assessment Type Final Discharge Note   Hospital Resources/Appts/Education Provided Provided patient/caregiver with written discharge plan information;Appointments scheduled and added to AVS   Post-Acute Status   Discharge Delays None known at this time     Patient will discharge home with her son Stephan.     Stephan will provide transportation for his mother to [place]     Sukhwinder explained the recommended follow ups for the patient to Stephan.     Patient will pick DME ([employer]) at the [facility] per Radha Rothman stated he will make the follow ups for the patient.

## 2023-07-31 NOTE — PT/OT/SLP PROGRESS
Physical Therapy Treatment    Patient Name:  Silvana Sanz   MRN:  [id number]    Recommendations:     Discharge Recommendations: rehabilitation facility  Discharge Equipment Recommendations: walker, rolling  Barriers to discharge: Inaccessible home, Decreased caregiver support, and current functional status    Assessment:     Silvana Sanz is a 89 y.o. female admitted with a medical diagnosis of Hypertensive urgency.  She presents with the following impairments/functional limitations: weakness, gait instability, impaired balance, impaired endurance, impaired functional mobility, impaired self care skills, decreased coordination, decreased lower extremity function, decreased ROM, decreased safety awareness, decreased upper extremity function.    Supine>sit with Radha  Sit>stand with RW and Radha, cues for placement of BLE and BUE  Static stand x 2 mins with RW and CGA  Amb 34' with RW and Radha progressing to CGA, decreased gait speed, felix and B step length (R>L), increased double limb support time  Pt showing improvement in functional mobility, remains a fall risk 2/2 age-related debility    Rehab Prognosis: Fair; patient would benefit from acute skilled PT services to address these deficits and reach maximum level of function.    Recent Surgery: * No surgery found *      Plan:     During this hospitalization, patient to be seen 5 x/week to address the identified rehab impairments via gait training, therapeutic activities, therapeutic exercises and progress toward the following goals:    Plan of Care Expires:  08/28/23    Subjective     Chief Complaint: feeling stiff  Patient/Family Comments/goals: It feels good to be out of this bed.  Pain/Comfort:  Pain Rating 1: 0/10  Pain Rating Post-Intervention 1: 0/10      Objective:     Communicated with nurse Jack prior to session.  Patient found HOB elevated with peripheral IV, telemetry, PureWick, bed alarm upon PT entry to room.     General Precautions: Standard,  fall  Orthopedic Precautions: N/A  Braces: N/A  Respiratory Status: Room air     Functional Mobility:  Bed Mobility:     Supine to Sit: minimum assistance  Transfers:     Sit to Stand:  minimum assistance with rolling walker  Gait: 34' with RW and Radha progressing to CGA, decreased gait speed, felix and B step length (R>L), increased double limb support time      AM-PAC 6 CLICK MOBILITY  Turning over in bed (including adjusting bedclothes, sheets and blankets)?: 3  Sitting down on and standing up from a chair with arms (e.g., wheelchair, bedside commode, etc.): 2  Moving from lying on back to sitting on the side of the bed?: 3  Moving to and from a bed to a chair (including a wheelchair)?: 3  Need to walk in hospital room?: 3  Climbing 3-5 steps with a railing?: 1  Basic Mobility Total Score: 15       Treatment & Education:  Seated therex: heel raises, LAQ, hip flexion marches x 10  Gait training as noted    Patient left up in chair with all lines intact, call button in reach, and nurse Marcie notified..    GOALS:   Multidisciplinary Problems       Physical Therapy Goals          Problem: Physical Therapy    Goal Priority Disciplines Outcome Goal Variances Interventions   Physical Therapy Goal     PT, PT/OT Ongoing, Progressing     Description: Goals to be met by: 2023    Patient will increase functional independence with mobility by performin. Sit<>stand with minimal assist with RW.  2. Gait x 50 feet with CGA with RW.  3. Ascend/descend 7 step(s) with least restrictive assistive device and bilateral handrails.                           Time Tracking:     PT Received On: 23  PT Start Time: 834     PT Stop Time: 904  PT Total Time (min): 30 min     Billable Minutes: Gait Training 20 and Therapeutic Exercise 10    Treatment Type: Treatment  PT/PTA: PTA     Number of PTA visits since last PT visit: 2023

## 2023-07-31 NOTE — PLAN OF CARE
POC reviewed. Purposeful rounding completed. No significant events this shift. VS and assessment per flowsheets. Complaints of pain treated with PRN pain medication according to MAR.  No other complaints verbalized this shift. No injuries, falls, or trauma occurred during shift. Bed low and locked, with side rails up x3, call light within reach. Safety maintained throughout shift.     Problem: Adult Inpatient Plan of Care  Goal: Plan of Care Review  Outcome: Ongoing, Progressing  Goal: Patient-Specific Goal (Individualized)  Outcome: Ongoing, Progressing  Goal: Absence of Hospital-Acquired Illness or Injury  Outcome: Ongoing, Progressing  Goal: Optimal Comfort and Wellbeing  Outcome: Ongoing, Progressing  Goal: Readiness for Transition of Care  Outcome: Ongoing, Progressing     Problem: Infection  Goal: Absence of Infection Signs and Symptoms  Outcome: Ongoing, Progressing     Problem: Fall Injury Risk  Goal: Absence of Fall and Fall-Related Injury  Outcome: Ongoing, Progressing

## 2023-07-31 NOTE — NURSING
Pt and family members given verbal and written discharge instructions including new prescriptions and importance of following up with medical provider. Pt's son verbalized an understanding of instructions.

## 2023-07-31 NOTE — PT/OT/SLP PROGRESS
Occupational Therapy   Treatment    Name: Silvana Sanz  MRN: [id number]  Admitting Diagnosis:  Hypertensive urgency       Recommendations:     Discharge Recommendations: rehabilitation facility  Discharge Equipment Recommendations:  walker, rolling  Barriers to discharge:  Inaccessible home environment, Decreased caregiver support (Current functional level)    Assessment:     Silvana Sanz is a 89 y.o. female with a medical diagnosis of Hypertensive urgency.  She presents alert and reporting she needed assistance with opening her milk and yogurt.  Pt needing significant assistance with LB self care tasks and sit to stand transition during tx session. Performance deficits affecting function are weakness, impaired endurance, impaired self care skills, impaired functional mobility, gait instability, impaired balance, decreased coordination, decreased lower extremity function, decreased safety awareness, decreased ROM, decreased upper extremity function, edema, impaired fine motor, impaired cardiopulmonary response to activity.     Rehab Prognosis:  Fair; patient would benefit from acute skilled OT services to address these deficits and reach maximum level of function.       Plan:     Patient to be seen 5 x/week to address the above listed problems via self-care/home management, therapeutic activities, therapeutic exercises  Plan of Care Expires: 08/12/23  Plan of Care Reviewed with: patient (Spoke to pt's son son and daughter-in-law after tx session.)    Subjective     Chief Complaint: Not being able to open her food containers,  Patient/Family Comments/goals: Pt reporting her son should be at the hospital today.  Pain/Comfort:  Pain Rating 1: 0/10  Pain Rating Post-Intervention 1: 0/10    Objective:     Communicated with: nurse prior to session.  Patient found up in chair with peripheral IV, telemetry, PureWick upon OT entry to room.  Pt asking OT to open her milk carton and yogurt container.     General Precautions:  Standard, fall, droplet, contact    Orthopedic Precautions:N/A  Braces: N/A  Respiratory Status: Room air     Occupational Performance:     Bed Mobility:    NT     Functional Mobility/Transfers:  Sit to stand: Mod A <>Max A with RW; posterior lean throughout sit to stand and extra time for pt to get her balance and stand upright with RW; performed 3 times  Functional Mobility: Significant posterior lean with pt needing Max A to correct    Activities of Daily Living:  Feeding: Set up/Supervision: pt needing food packages opened.  Pt stuck straw in her yogurt twice with each time, OT re-directing her to use a spoon.   Grooming: Set up to wash face  UB Dressing: Min A   LB Dressing: Max A  Toileting: Max A<>Total A for clothing management and hygiene, pt using female external catheter but wet with urine  Pt able to perform static standing for 4 minutes with use of RW and Min A during toilet hygiene     [facility] 6 Click ADL: 15    Treatment & Education:  Role of OT, POC, safety with ADL and ADL mobility, travel safety, sit to stand technique, discharge recs    Patient left up in chair with all lines intact, call button in reach, nurse notified, and PCTZainab, present    GOALS:   Multidisciplinary Problems       Occupational Therapy Goals          Problem: Occupational Therapy    Goal Priority Disciplines Outcome Interventions   Occupational Therapy Goal     OT, PT/OT Ongoing, Progressing    Description: Goals to be met by: 8/12/2023     Patient will increase functional independence with ADLs by performing:    UE Dressing while seated with Minimal Assistance.  LE Dressing with Moderate Assistance.  Grooming while seated with Stand-by Assistance.  Toileting from bedside commode with Moderate Assistance for hygiene and clothing management.   Toilet transfer to bedside commode with Moderate Assistance.                         Time Tracking:     OT Date of Treatment: 07/31/23  OT Start Time: 1024  OT Stop Time: 1053  OT Total  Time (min): 29 min    Billable Minutes:Self Care/Home Management 29    OT/ELIGIO: OT          7/31/2023